# Patient Record
Sex: MALE | Race: OTHER | Employment: OTHER | URBAN - METROPOLITAN AREA
[De-identification: names, ages, dates, MRNs, and addresses within clinical notes are randomized per-mention and may not be internally consistent; named-entity substitution may affect disease eponyms.]

---

## 2020-07-20 ENCOUNTER — OFFICE VISIT (OUTPATIENT)
Dept: FAMILY MEDICINE CLINIC | Age: 75
End: 2020-07-20
Payer: COMMERCIAL

## 2020-07-20 VITALS
OXYGEN SATURATION: 99 % | HEIGHT: 64 IN | DIASTOLIC BLOOD PRESSURE: 78 MMHG | TEMPERATURE: 97.2 F | HEART RATE: 70 BPM | WEIGHT: 134 LBS | BODY MASS INDEX: 22.88 KG/M2 | SYSTOLIC BLOOD PRESSURE: 130 MMHG

## 2020-07-20 PROBLEM — I35.8 AORTIC HEART MURMUR: Status: ACTIVE | Noted: 2020-07-20

## 2020-07-20 PROCEDURE — 99203 OFFICE O/P NEW LOW 30 MIN: CPT | Performed by: FAMILY MEDICINE

## 2020-07-20 RX ORDER — BLOOD-GLUCOSE METER
1 KIT MISCELLANEOUS DAILY
Qty: 1 KIT | Refills: 1 | Status: SHIPPED | OUTPATIENT
Start: 2020-07-20 | End: 2022-06-27 | Stop reason: SDUPTHER

## 2020-07-20 RX ORDER — ATORVASTATIN CALCIUM 10 MG/1
10 TABLET, FILM COATED ORAL DAILY
Qty: 90 TABLET | Refills: 3 | Status: SHIPPED | OUTPATIENT
Start: 2020-07-20 | End: 2021-12-27 | Stop reason: SDUPTHER

## 2020-07-20 RX ORDER — ATORVASTATIN CALCIUM 10 MG/1
TABLET, FILM COATED ORAL
COMMUNITY
End: 2020-07-20 | Stop reason: SDUPTHER

## 2020-07-20 SDOH — HEALTH STABILITY: MENTAL HEALTH: HOW OFTEN DO YOU HAVE A DRINK CONTAINING ALCOHOL?: NEVER

## 2020-07-20 ASSESSMENT — PATIENT HEALTH QUESTIONNAIRE - PHQ9
SUM OF ALL RESPONSES TO PHQ9 QUESTIONS 1 & 2: 0
SUM OF ALL RESPONSES TO PHQ QUESTIONS 1-9: 0
2. FEELING DOWN, DEPRESSED OR HOPELESS: 0
1. LITTLE INTEREST OR PLEASURE IN DOING THINGS: 0
SUM OF ALL RESPONSES TO PHQ QUESTIONS 1-9: 0

## 2020-07-20 ASSESSMENT — ENCOUNTER SYMPTOMS
CONSTIPATION: 0
ALLERGIC/IMMUNOLOGIC NEGATIVE: 1
DIARRHEA: 0
BLOOD IN STOOL: 0
EYES NEGATIVE: 1
ABDOMINAL PAIN: 0
COUGH: 0
SHORTNESS OF BREATH: 0

## 2020-07-20 NOTE — PROGRESS NOTES
He does not see a podiatrist.Eye exam is current. Vitals:    07/20/20 1510   BP: 130/78   Pulse: 70   Temp: 97.2 °F (36.2 °C)   SpO2: 99%   Weight: 134 lb (60.8 kg)   Height: 5' 4\" (1.626 m)       REVIEW OF SYSTEMS    Review of Systems   Constitutional: Negative for fatigue, fever, unexpected weight change and weight loss. HENT: Negative. Eyes: Negative. Respiratory: Negative for cough and shortness of breath. Cardiovascular: Negative for chest pain and leg swelling. Gastrointestinal: Negative for abdominal pain, blood in stool, constipation and diarrhea. Endocrine: Negative. Genitourinary: Negative for frequency and urgency. Musculoskeletal: Negative. Skin: Negative. Allergic/Immunologic: Negative. Neurological: Negative for dizziness and headaches. Hematological: Negative. Psychiatric/Behavioral: Negative for sleep disturbance. The patient is not nervous/anxious.         PAST MEDICAL HISTORY    Past Medical History:   Diagnosis Date    Diabetes type 2, controlled ([facility] [address]     Hyperlipidemia        FAMILY HISTORY    Family History   Problem Relation Age of Onset    Parkinsonism Father        SOCIAL HISTORY    Social History     Socioeconomic History    Marital status:      Spouse name: None    Number of children: 1    Years of education: None    Highest education level: None   Occupational History    Occupation: retired    Social Needs    Financial resource strain: None    Food insecurity     Worry: None     Inability: None    Transportation needs     Medical: None     Non-medical: None   Tobacco Use    Smoking status: Never Smoker    Smokeless tobacco: Never Used   Substance and Sexual Activity    Alcohol use: Never     Frequency: Never    Drug use: Never    Sexual activity: None   Lifestyle    Physical activity     Days per week: None     Minutes per session: None    Stress: None   Relationships    Social connections     Talks on phone: None Gets together: None     Attends Gnosticism service: None     Active member of club or organization: None     Attends meetings of clubs or organizations: None     Relationship status: None    Intimate partner violence     Fear of current or ex partner: None     Emotionally abused: None     Physically abused: None     Forced sexual activity: None   Other Topics Concern    None   Social History Narrative    None       SURGICAL HISTORY    Past Surgical History:   Procedure Laterality Date   Thedore Bio  2001    COLONOSCOPY  2014    2004. Dr. Lui De La Torre at [address] [facility]    Current Outpatient Medications   Medication Sig Dispense Refill    metFORMIN (GLUCOPHAGE) 500 MG tablet Take 1 tablet by mouth 3 times daily (with meals) 270 tablet 3    atorvastatin (LIPITOR) 10 MG tablet Take 1 tablet by mouth daily 90 tablet 3    glucose monitoring kit (FREESTYLE) monitoring kit 1 kit by Does not apply route daily 1 kit 1     No current facility-administered medications for this visit. ALLERGIES    Allergies not on file    PHYSICAL EXAM   Physical Exam  Vitals signs reviewed. Constitutional:       Appearance: He is well-developed. HENT:      Head: Normocephalic. Eyes:      Extraocular Movements: Extraocular movements intact. Conjunctiva/sclera: Conjunctivae normal.      Pupils: Pupils are equal, round, and reactive to light. Neck:      Musculoskeletal: Normal range of motion and neck supple. Thyroid: No thyromegaly. Cardiovascular:      Rate and Rhythm: Normal rate and regular rhythm. Heart sounds: Murmur present. Systolic murmur present with a grade of 2/6. Comments: Slight murmur rt sternal border. Pulmonary:      Effort: Pulmonary effort is normal.      Breath sounds: Normal breath sounds. No wheezing or rales. Abdominal:      Palpations: Abdomen is soft. Tenderness: There is no abdominal tenderness. There is no guarding or rebound.    Musculoskeletal: Normal range of motion. General: No tenderness or deformity. Lymphadenopathy:      Cervical: No cervical adenopathy. Skin:     General: Skin is warm and dry. Neurological:      Mental Status: He is alert and oriented to person, place, and time. Psychiatric:         Behavior: Behavior normal.         Assessment/PLan  1. Controlled type 2 diabetes mellitus without complication, without long-term current use of insulin (HCC)  Chronic, stable. He is requesting a new glucometer.   - metFORMIN (GLUCOPHAGE) 500 MG tablet; Take 1 tablet by mouth 3 times daily (with meals)  Dispense: 270 tablet; Refill: 3  - glucose monitoring kit (FREESTYLE) monitoring kit; 1 kit by Does not apply route daily  Dispense: 1 kit; Refill: 1  - Comprehensive Metabolic Panel; Future  - Lipid Panel; Future  - Hemoglobin A1C; Future    2. Mixed hyperlipidemia  Chronic, stable, continue current medication and/or plan    - atorvastatin (LIPITOR) 10 MG tablet; Take 1 tablet by mouth daily  Dispense: 90 tablet; Refill: 3  - Lipid Panel; Future    3. Encounter to establish care  Reviewed hx with pt and available records in his chart. 4. Cardiac murmur. Discussed possible aortic valve. Monitor for any sx of sob, chest pain. Jazlyn Karly received counseling on the following healthy behaviors: nutrition, exercise and medication adherence  Reviewed prior labs and health maintenance. Continue current medications, diet and exercise. Discussed use, benefit, and side effects of prescribed medications. Barriers to medication compliance addressed. Patient given educational materials - see patient instructions. All patient questions answered. Patient voiced understanding. Return in about 5 months (around 12/20/2020) for diabetes, cholesterol.         Electronically signed by Tammy Gupta MD on 7/20/20 at 3:18 PM EDT

## 2020-07-24 RX ORDER — GLUCOSAMINE HCL/CHONDROITIN SU 500-400 MG
CAPSULE ORAL
Qty: 100 STRIP | Refills: 0 | Status: SHIPPED | OUTPATIENT
Start: 2020-07-24 | End: 2020-07-30 | Stop reason: SDUPTHER

## 2020-07-24 RX ORDER — LANCETS 30 GAUGE
1 EACH MISCELLANEOUS 2 TIMES DAILY
Qty: 100 EACH | Refills: 0 | Status: SHIPPED | OUTPATIENT
Start: 2020-07-24 | End: 2020-07-30 | Stop reason: SDUPTHER

## 2020-07-24 RX ORDER — BLOOD-GLUCOSE METER
1 KIT MISCELLANEOUS DAILY
Qty: 1 KIT | Refills: 0 | Status: SHIPPED | OUTPATIENT
Start: 2020-07-24 | End: 2020-07-30 | Stop reason: SDUPTHER

## 2020-07-30 RX ORDER — LANCETS 30 GAUGE
1 EACH MISCELLANEOUS 2 TIMES DAILY
Qty: 100 EACH | Refills: 0 | Status: SHIPPED | OUTPATIENT
Start: 2020-07-30 | End: 2020-11-30 | Stop reason: SDUPTHER

## 2020-07-30 RX ORDER — BLOOD-GLUCOSE METER
1 KIT MISCELLANEOUS DAILY
Qty: 1 KIT | Refills: 0 | Status: SHIPPED | OUTPATIENT
Start: 2020-07-30

## 2020-07-30 RX ORDER — GLUCOSAMINE HCL/CHONDROITIN SU 500-400 MG
CAPSULE ORAL
Qty: 100 STRIP | Refills: 0 | Status: SHIPPED | OUTPATIENT
Start: 2020-07-30 | End: 2021-12-28

## 2020-11-30 RX ORDER — LANCETS 30 GAUGE
1 EACH MISCELLANEOUS 2 TIMES DAILY
Qty: 100 EACH | Refills: 3 | Status: SHIPPED | OUTPATIENT
Start: 2020-11-30 | End: 2021-12-28

## 2020-12-21 ENCOUNTER — HOSPITAL ENCOUNTER (OUTPATIENT)
Age: 75
Setting detail: SPECIMEN
Discharge: HOME | End: 2020-12-21
Payer: COMMERCIAL

## 2020-12-21 LAB
ALBUMIN SERPL-MCNC: 4.3 G/DL (ref 3.5–5.2)
ALBUMIN/GLOBULIN RATIO: 1.7 (ref 1–2.5)
ALP BLD-CCNC: 67 U/L (ref 40–129)
ALT SERPL-CCNC: 11 U/L (ref 5–41)
ANION GAP SERPL CALCULATED.3IONS-SCNC: 13 MMOL/L (ref 9–17)
AST SERPL-CCNC: 19 U/L
BILIRUB SERPL-MCNC: 0.45 MG/DL (ref 0.3–1.2)
BUN BLDV-MCNC: 15 MG/DL (ref 8–23)
BUN/CREAT BLD: NORMAL (ref 9–20)
CALCIUM SERPL-MCNC: 9.1 MG/DL (ref 8.6–10.4)
CHLORIDE BLD-SCNC: 101 MMOL/L (ref 98–107)
CHOLESTEROL/HDL RATIO: 2.4
CHOLESTEROL: 121 MG/DL
CO2: 25 MMOL/L (ref 20–31)
CREAT SERPL-MCNC: 1.08 MG/DL (ref 0.7–1.2)
ESTIMATED AVERAGE GLUCOSE: 131 MG/DL
GFR AFRICAN AMERICAN: >60 ML/MIN
GFR NON-AFRICAN AMERICAN: >60 ML/MIN
GFR SERPL CREATININE-BSD FRML MDRD: NORMAL ML/MIN/{1.73_M2}
GFR SERPL CREATININE-BSD FRML MDRD: NORMAL ML/MIN/{1.73_M2}
GLUCOSE BLD-MCNC: 92 MG/DL (ref 70–99)
HBA1C MFR BLD: 6.2 % (ref 4–6)
HDLC SERPL-MCNC: 50 MG/DL
LDL CHOLESTEROL: 56 MG/DL (ref 0–130)
POTASSIUM SERPL-SCNC: 4.5 MMOL/L (ref 3.7–5.3)
SODIUM BLD-SCNC: 139 MMOL/L (ref 135–144)
TOTAL PROTEIN: 6.8 G/DL (ref 6.4–8.3)
TRIGL SERPL-MCNC: 73 MG/DL
VLDLC SERPL CALC-MCNC: NORMAL MG/DL (ref 1–30)

## 2020-12-30 ENCOUNTER — TELEMEDICINE (OUTPATIENT)
Dept: FAMILY MEDICINE CLINIC | Age: 75
End: 2020-12-30
Payer: MEDICARE

## 2020-12-30 PROCEDURE — 4040F PNEUMOC VAC/ADMIN/RCVD: CPT | Performed by: FAMILY MEDICINE

## 2020-12-30 PROCEDURE — 1123F ACP DISCUSS/DSCN MKR DOCD: CPT | Performed by: FAMILY MEDICINE

## 2020-12-30 PROCEDURE — G8427 DOCREV CUR MEDS BY ELIG CLIN: HCPCS | Performed by: FAMILY MEDICINE

## 2020-12-30 PROCEDURE — 99213 OFFICE O/P EST LOW 20 MIN: CPT | Performed by: FAMILY MEDICINE

## 2020-12-30 SDOH — ECONOMIC STABILITY: FOOD INSECURITY: WITHIN THE PAST 12 MONTHS, THE FOOD YOU BOUGHT JUST DIDN'T LAST AND YOU DIDN'T HAVE MONEY TO GET MORE.: NEVER TRUE

## 2020-12-30 SDOH — ECONOMIC STABILITY: TRANSPORTATION INSECURITY
IN THE PAST 12 MONTHS, HAS THE LACK OF TRANSPORTATION KEPT YOU FROM MEDICAL APPOINTMENTS OR FROM GETTING MEDICATIONS?: NO

## 2020-12-30 SDOH — ECONOMIC STABILITY: INCOME INSECURITY: HOW HARD IS IT FOR YOU TO PAY FOR THE VERY BASICS LIKE FOOD, HOUSING, MEDICAL CARE, AND HEATING?: NOT HARD AT ALL

## 2020-12-30 SDOH — ECONOMIC STABILITY: TRANSPORTATION INSECURITY
IN THE PAST 12 MONTHS, HAS LACK OF TRANSPORTATION KEPT YOU FROM MEETINGS, WORK, OR FROM GETTING THINGS NEEDED FOR DAILY LIVING?: NO

## 2020-12-30 SDOH — ECONOMIC STABILITY: FOOD INSECURITY: WITHIN THE PAST 12 MONTHS, YOU WORRIED THAT YOUR FOOD WOULD RUN OUT BEFORE YOU GOT MONEY TO BUY MORE.: NEVER TRUE

## 2020-12-30 ASSESSMENT — ENCOUNTER SYMPTOMS
ABDOMINAL PAIN: 0
BLOOD IN STOOL: 0
ALLERGIC/IMMUNOLOGIC NEGATIVE: 1
EYES NEGATIVE: 1
CONSTIPATION: 0
DIARRHEA: 0
COUGH: 0
SHORTNESS OF BREATH: 0

## 2020-12-30 NOTE — PROGRESS NOTES
[facility]  [facility]  [address]  [facility] [address]  Dept: [phone]    2020    TELEHEALTH EVALUATION -- Audio/Visual (During [facility]-49 public health emergency)  Jairon Schilling (:  1945) has requested an audio/video evaluation for the following concern(s):    HPI:  Video visit to discuss diabetes. Has been checking blood sugar at home and it runs in the low 90s to 100s. Blood pressure readings at home are typically around 130/80 but has occasionally had readings up to 160. He is active with walking outside. He is following a healthy diet. Recent lab tests were reviewed which were normal.  A1c was 6.2. He is taking a statin for hyperlipidemia. Has not been on any blood pressure medication. He was prescribed lisinopril in the past but did not take it because his blood pressure was normal.    Diabetes  He presents for his follow-up diabetic visit. He has type 2 diabetes mellitus. His disease course has been stable. There are no hypoglycemic associated symptoms. Pertinent negatives for hypoglycemia include no dizziness, headaches or nervousness/anxiousness. There are no diabetic associated symptoms. Pertinent negatives for diabetes include no chest pain and no fatigue. There are no hypoglycemic complications. Symptoms are stable. Pertinent negatives for diabetic complications include no heart disease, nephropathy or peripheral neuropathy. Risk factors for coronary artery disease include diabetes mellitus, dyslipidemia and male sex. Current diabetic treatment includes oral agent (monotherapy). He is compliant with treatment all of the time. His weight is stable. He is following a generally healthy diet. He has not had a previous visit with a dietitian. He participates in exercise daily. His home blood glucose trend is fluctuating minimally. His overall blood glucose range is  mg/dl.  An ACE inhibitor/angiotensin II receptor blocker is not being taken. There were no vitals filed for this visit. REVIEW OF SYSTEMS:   Review of Systems   Constitutional: Negative for fatigue, fever and unexpected weight change. HENT: Negative. Eyes: Negative. Respiratory: Negative for cough and shortness of breath. Cardiovascular: Negative for chest pain and leg swelling. Gastrointestinal: Negative for abdominal pain, blood in stool, constipation and diarrhea. Endocrine: Negative. Genitourinary: Negative for frequency and urgency. Musculoskeletal: Negative. Skin: Negative. Allergic/Immunologic: Negative. Neurological: Negative for dizziness and headaches. Hematological: Negative. Psychiatric/Behavioral: Negative for sleep disturbance. The patient is not nervous/anxious.         PAST MEDICAL HISTORY:    Past Medical History:   Diagnosis Date    Diabetes type 2, controlled ([facility] [address]     Hyperlipidemia        FAMILY HISTORY:    Family History   Problem Relation Age of Onset    Parkinsonism Father        SOCIAL HISTORY:    Social History     Socioeconomic History    Marital status:      Spouse name: None    Number of children: 1    Years of education: None    Highest education level: None   Occupational History    Occupation: retired    Social Needs    Financial resource strain: Not hard at all   [place] insecurity     Worry: Never true     Inability: Never true    Transportation needs     Medical: No     Non-medical: No   Tobacco Use    Smoking status: Never Smoker    Smokeless tobacco: Never Used   Substance and Sexual Activity    Alcohol use: Never     Frequency: Never    Drug use: Never    Sexual activity: None   Lifestyle    Physical activity     Days per week: None     Minutes per session: None    Stress: None   Relationships    Social connections     Talks on phone: None     Gets together: None     Attends Yarsanism service: None     Active member of club or organization: None ASSESSMENT/PLAN:  1. Controlled type 2 diabetes mellitus without complication, without long-term current use of insulin (HCC)  Stable condition. Blood sugars under good control. Continue medications, exercise and low-carb diet. Continue to monitor blood pressure if it does go to 140 or higher on a consistent basis and I will add lisinopril. Reviewed recent labs with patient. 2. Mixed hyperlipidemia  Chronic and stable condition. Continue statin    Discussed getting Covid vaccine when it becomes available to him. He is agreeable to this. Return in about 6 months (around 6/30/2021) for diabetes. Arnold Henao is a 66 y.o. male being evaluated by a Virtual Visit (video visit) encounter to address concerns as mentioned above. A caregiver was present when appropriate. Due to this being a TeleHealth encounter (During [facility] public health emergency), evaluation of the following organ systems was limited: Vitals/Constitutional/EENT/Resp/CV/GI//MS/Neuro/Skin/Heme-Lymph-Imm. Pursuant to the emergency declaration under the [address] [place] [address] and the [employer] and [employer] Act, this Virtual Visit was conducted with patient's (and/or legal guardian's) consent, to reduce the patient's risk of exposure to COVID-19 and provide necessary medical care. The patient (and/or legal guardian) has also been advised to contact this office for worsening conditions or problems, and seek emergency medical treatment and/or call 911 if deemed necessary. Services were provided through a video synchronous discussion virtually to substitute for in-person clinic visit. Patient and provider were located at their individual homes. --Clinton Cortés MD on 12/30/2020 at 9:29 AM    An electronic signature was used to authenticate this note.

## 2021-04-27 ENCOUNTER — OFFICE VISIT (OUTPATIENT)
Dept: FAMILY MEDICINE CLINIC | Age: 76
End: 2021-04-27
Payer: MEDICARE

## 2021-04-27 VITALS
DIASTOLIC BLOOD PRESSURE: 72 MMHG | WEIGHT: 132.2 LBS | BODY MASS INDEX: 22.69 KG/M2 | TEMPERATURE: 97.5 F | OXYGEN SATURATION: 99 % | HEART RATE: 66 BPM | SYSTOLIC BLOOD PRESSURE: 144 MMHG

## 2021-04-27 DIAGNOSIS — M25.532 LEFT WRIST PAIN: ICD-10-CM
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
## 2021-04-27 PROCEDURE — 1036F TOBACCO NON-USER: CPT | Performed by: NURSE PRACTITIONER

## 2021-04-27 PROCEDURE — 4040F PNEUMOC VAC/ADMIN/RCVD: CPT | Performed by: NURSE PRACTITIONER

## 2021-04-27 PROCEDURE — G8427 DOCREV CUR MEDS BY ELIG CLIN: HCPCS | Performed by: NURSE PRACTITIONER

## 2021-04-27 PROCEDURE — 1123F ACP DISCUSS/DSCN MKR DOCD: CPT | Performed by: NURSE PRACTITIONER

## 2021-04-27 PROCEDURE — 99214 OFFICE O/P EST MOD 30 MIN: CPT | Performed by: NURSE PRACTITIONER

## 2021-04-27 PROCEDURE — G8420 CALC BMI NORM PARAMETERS: HCPCS | Performed by: NURSE PRACTITIONER

## 2021-04-27 ASSESSMENT — ENCOUNTER SYMPTOMS
VOMITING: 0
GASTROINTESTINAL NEGATIVE: 1
SHORTNESS OF BREATH: 1
CONSTIPATION: 0
EYES NEGATIVE: 1
NAUSEA: 0
ABDOMINAL PAIN: 0
DIARRHEA: 0
COUGH: 0

## 2021-04-27 ASSESSMENT — PATIENT HEALTH QUESTIONNAIRE - PHQ9: SUM OF ALL RESPONSES TO PHQ QUESTIONS 1-9: 0

## 2021-04-27 NOTE — PROGRESS NOTES
[facility]  [facility]  [address]  [address]  Dept: [phone]    4/28/2021    CHIEF COMPLAINT    Chief Complaint   Patient presents with   Kranthi CAZARES    Louis Avila is a 66 y.o. male who presents   Chief Complaint   Patient presents with    Fall     Appointment to discuss recent fall. Fall on Friday- He was walking per his usual 3-4 miles per day. He lost his footing when lifting his foot up onto a curb. He notes that he fell \"flat on his face\". A person close by was a physician and came to his aid. She told him that his pulse was 22 initially, but \"it was coming back up to normal\"  He was evaluated by EMTs who checked his BP & blood sugar. He felt okay and was not transported to the hospital. He  was dropped off at his car and went home where he was valuated by his daughter who is a physician and monitored by his wife. TODAY- noting pain in left wrist,  bruise on his right knee facial abrasions. He notes his swelling and pain across his forehead is resolving no longer tender to the touch. He denies headaches, dizziness, nausea and blurred vision. He denies taking any blood thinners or excessive amounts of NSAIDs prior to fall. He has been taking Advil and tylenol as needed for pains. He would like a referral for physical therapy for balance training. He is not interested in any further testing or x-rays at this time    Notable history of fall at home in 2001. He notes that he had a similar issue losing his footing. Sometime after the fall he developed headaches and was found to have a subdural hematoma. DM- noting his fasting BG everyday is always below 100 for the last 1 year.         Vitals:    04/27/21 1259 04/27/21 1349   BP: (!) 146/70 (!) 144/72   Site:  Left Upper Arm   Position:  Sitting   Cuff Size:  Medium Adult   Pulse: 66    Temp: 97.5 °F (36.4 °C)    SpO2: 99%    Weight: 132 lb 3.2 oz (60 kg) Gets together: None     Attends Jew service: None     Active member of club or organization: None     Attends meetings of clubs or organizations: None     Relationship status: None    Intimate partner violence     Fear of current or ex partner: None     Emotionally abused: None     Physically abused: None     Forced sexual activity: None   Other Topics Concern    None   Social History Narrative    None       SURGICAL HISTORY    Past Surgical History:   Procedure Laterality Date   Mali Stahl  2001    COLONOSCOPY  2014 2004. Dr. Litzy Horton at [facility]    Current Outpatient Medications   Medication Sig Dispense Refill    Lancets MISC 1 each by Does not apply route 2 times daily 100 each 3    blood glucose monitor strips Test 3 times a day & as needed for symptoms of irregular blood glucose. 100 strip 0    glucose monitoring kit (FREESTYLE) monitoring kit 1 kit by Does not apply route daily 1 kit 0    metFORMIN (GLUCOPHAGE) 500 MG tablet Take 1 tablet by mouth 3 times daily (with meals) 270 tablet 3    atorvastatin (LIPITOR) 10 MG tablet Take 1 tablet by mouth daily 90 tablet 3    calcium carbonate 1500 (600 Ca) MG TABS tablet Take 600 mg by mouth daily (with breakfast)      glucose monitoring kit (FREESTYLE) monitoring kit 1 kit by Does not apply route daily 1 kit 1     No current facility-administered medications for this visit. ALLERGIES    Allergies   Allergen Reactions    No Known Allergies        PHYSICAL EXAM   Physical Exam  Vitals signs and nursing note reviewed. Constitutional:       General: He is not in acute distress. Appearance: He is well-developed. He is not diaphoretic. Interventions: Face mask in place. HENT:      Head: Normocephalic. Abrasion present. Right Ear: Hearing, tympanic membrane, ear canal and external ear normal. No middle ear effusion. There is no impacted cerumen.       Left Ear: Hearing, tympanic membrane, ear canal and external ear normal.  No middle ear effusion. There is no impacted cerumen. Eyes:      General:         Right eye: No discharge. Left eye: No discharge. Pupils: Pupils are equal.   Neck:      Musculoskeletal: Normal range of motion. Cardiovascular:      Rate and Rhythm: Normal rate and regular rhythm. Pulses: Normal pulses. Radial pulses are 2+ on the right side and 2+ on the left side. Heart sounds: Normal heart sounds, S1 normal and S2 normal. No murmur. Pulmonary:      Effort: Pulmonary effort is normal. No respiratory distress. Breath sounds: Normal breath sounds. No wheezing. Musculoskeletal:      Left wrist: He exhibits tenderness (along lateral edge. ). He exhibits normal range of motion, no bony tenderness, no swelling, no effusion and no deformity. Arms:    Skin:     General: Skin is warm and dry. Neurological:      Mental Status: He is alert and oriented to person, place, and time. Motor: No weakness or tremor. Coordination: Coordination normal.      Gait: Gait normal.   Psychiatric:         Behavior: Behavior normal. Behavior is cooperative. ASSESSMENT/PLAN  1. Fall, initial encounter  -     External Referral To Physical Therapy  -Refer patient to [facility] for balance and mobility training.  -Discussed ordering blood work, CT scan and EKG with patient. Patient declines all testing at this time. Patient feels testing is unnecessary due to being asymptomatic. Patient has follow-up with Dr. Davi Kessler in June, but will call office for any concerning symptoms prior to that time    2. Left wrist pain    -We will hold off on ordering an x-ray at this time  given normal ROM and minimal tenderness.    - Patient to call office if symptoms worsen or fail to  fully improve    Anupam received counseling on the following healthy behaviors: nutrition, exercise and medication adherence  Reviewed prior labs and health maintenance.   Continue current

## 2021-06-28 ENCOUNTER — OFFICE VISIT (OUTPATIENT)
Dept: FAMILY MEDICINE CLINIC | Age: 76
End: 2021-06-28
Payer: MEDICARE

## 2021-06-28 VITALS
SYSTOLIC BLOOD PRESSURE: 130 MMHG | HEART RATE: 70 BPM | OXYGEN SATURATION: 98 % | DIASTOLIC BLOOD PRESSURE: 60 MMHG | HEIGHT: 65 IN | WEIGHT: 133.4 LBS | TEMPERATURE: 97.4 F | BODY MASS INDEX: 22.23 KG/M2

## 2021-06-28 DIAGNOSIS — E78.2 MIXED HYPERLIPIDEMIA: ICD-10-CM

## 2021-06-28 DIAGNOSIS — E11.9 CONTROLLED TYPE 2 DIABETES MELLITUS WITHOUT COMPLICATION, WITHOUT LONG-TERM CURRENT USE OF INSULIN (HCC): Primary | ICD-10-CM

## 2021-06-28 DIAGNOSIS — Z13.0 SCREENING FOR ENDOCRINE, METABOLIC AND IMMUNITY DISORDER: ICD-10-CM

## 2021-06-28 DIAGNOSIS — Z13.228 SCREENING FOR ENDOCRINE, METABOLIC AND IMMUNITY DISORDER: ICD-10-CM

## 2021-06-28 DIAGNOSIS — Z13.29 SCREENING FOR ENDOCRINE, METABOLIC AND IMMUNITY DISORDER: ICD-10-CM

## 2021-06-28 PROCEDURE — G8420 CALC BMI NORM PARAMETERS: HCPCS | Performed by: FAMILY MEDICINE

## 2021-06-28 PROCEDURE — 4040F PNEUMOC VAC/ADMIN/RCVD: CPT | Performed by: FAMILY MEDICINE

## 2021-06-28 PROCEDURE — G8427 DOCREV CUR MEDS BY ELIG CLIN: HCPCS | Performed by: FAMILY MEDICINE

## 2021-06-28 PROCEDURE — 1036F TOBACCO NON-USER: CPT | Performed by: FAMILY MEDICINE

## 2021-06-28 PROCEDURE — 99214 OFFICE O/P EST MOD 30 MIN: CPT | Performed by: FAMILY MEDICINE

## 2021-06-28 PROCEDURE — 1123F ACP DISCUSS/DSCN MKR DOCD: CPT | Performed by: FAMILY MEDICINE

## 2021-06-28 ASSESSMENT — ENCOUNTER SYMPTOMS
EYES NEGATIVE: 1
SHORTNESS OF BREATH: 0
DIARRHEA: 0
ABDOMINAL PAIN: 0
ALLERGIC/IMMUNOLOGIC NEGATIVE: 1
COUGH: 0
CONSTIPATION: 0
BLOOD IN STOOL: 0

## 2021-06-28 NOTE — PROGRESS NOTES
[facility]  [facility]  [address]  [facility] [address]  Dept: [phone]    6/28/2021    CHIEF COMPLAINT    Chief Complaint   Patient presents with    Diabetes       HPI    Tila Boswell is a 66 y.o. male who presents   Chief Complaint   Patient presents with    Diabetes   . Recheck chronic medical condtions. Taking medications as prescribed with no side effects and good effectiveness. Walking daily. Diabetes  He presents for his follow-up diabetic visit. He has type 2 diabetes mellitus. His disease course has been stable. There are no hypoglycemic associated symptoms. Pertinent negatives for hypoglycemia include no dizziness, headaches or nervousness/anxiousness. Pertinent negatives for diabetes include no chest pain and no fatigue. There are no hypoglycemic complications. There are no diabetic complications. Risk factors for coronary artery disease include diabetes mellitus, dyslipidemia and male sex. Current diabetic treatment includes oral agent (monotherapy). He is compliant with treatment all of the time. He is following a generally healthy diet. He participates in exercise daily. An ACE inhibitor/angiotensin II receptor blocker is not being taken. Eye exam is current. Vitals:    06/28/21 0903   BP: 130/60   Pulse: 70   Temp: 97.4 °F (36.3 °C)   SpO2: 98%   Weight: 133 lb 6.4 oz (60.5 kg)   Height: 5' 4.5\" (1.638 m)       REVIEW OF SYSTEMS    Review of Systems   Constitutional: Negative for fatigue, fever and unexpected weight change. HENT: Negative. Eyes: Negative. Respiratory: Negative for cough and shortness of breath. Cardiovascular: Negative for chest pain and leg swelling. Gastrointestinal: Negative for abdominal pain, blood in stool, constipation and diarrhea. Endocrine: Negative. Genitourinary: Negative for frequency and urgency. Musculoskeletal: Negative. Skin: Negative.          Dry skin, improves with lotion   Allergic/Immunologic: Negative. Neurological: Negative for dizziness and headaches. Hematological: Negative. Psychiatric/Behavioral: Negative for sleep disturbance. The patient is not nervous/anxious. PAST MEDICAL HISTORY    Past Medical History:   Diagnosis Date    Diabetes type 2, controlled ([address]     Hyperlipidemia        FAMILY HISTORY    Family History   Problem Relation Age of Onset    Parkinsonism Father        SOCIAL HISTORY    Social History     Socioeconomic History    Marital status:      Spouse name: None    Number of children: 1    Years of education: None    Highest education level: None   Occupational History    Occupation: retired    Tobacco Use    Smoking status: Never Smoker    Smokeless tobacco: Never Used   Substance and Sexual Activity    Alcohol use: Never    Drug use: Never    Sexual activity: None   Other Topics Concern    None   Social History Narrative    None     Social Determinants of Health     Financial Resource Strain: Low Risk     Difficulty of Paying Living Expenses: Not hard at all   Food Insecurity: No Food Insecurity    Worried About Running Out of Food in the Last Year: Never true    [place] of Food in the Last Year: Never true   Transportation Needs: No Transportation Needs    Lack of Transportation (Medical): No    Lack of Transportation (Non-Medical):  No   Physical Activity:     Days of Exercise per Week:     Minutes of Exercise per Session:    Stress:     Feeling of Stress :    Social Connections:     Frequency of Communication with Friends and Family:     Frequency of Social Gatherings with Friends and Family:     Attends Congregational Services:     Active Member of Clubs or Organizations:     Attends Club or Organization Meetings:     Marital Status:    Intimate Partner Violence:     Fear of Current or Ex-Partner:     Emotionally Abused:     Physically Abused:     Sexually Abused: SURGICAL HISTORY    Past Surgical History:   Procedure Laterality Date   Mali Stahl  2001    COLONOSCOPY  2014 2004. Dr. Litzy Horton at [address] [facility]    Current Outpatient Medications   Medication Sig Dispense Refill    calcium carbonate 1500 (600 Ca) MG TABS tablet Take 600 mg by mouth daily (with breakfast)      Lancets MISC 1 each by Does not apply route 2 times daily 100 each 3    blood glucose monitor strips Test 3 times a day & as needed for symptoms of irregular blood glucose. 100 strip 0    glucose monitoring kit (FREESTYLE) monitoring kit 1 kit by Does not apply route daily 1 kit 0    metFORMIN (GLUCOPHAGE) 500 MG tablet Take 1 tablet by mouth 3 times daily (with meals) 270 tablet 3    atorvastatin (LIPITOR) 10 MG tablet Take 1 tablet by mouth daily 90 tablet 3    glucose monitoring kit (FREESTYLE) monitoring kit 1 kit by Does not apply route daily 1 kit 1     No current facility-administered medications for this visit. ALLERGIES    Allergies   Allergen Reactions    No Known Allergies        PHYSICAL EXAM   Physical Exam  Vitals reviewed. Constitutional:       Appearance: He is well-developed and normal weight. HENT:      Head: Normocephalic. Eyes:      Conjunctiva/sclera: Conjunctivae normal.      Pupils: Pupils are equal, round, and reactive to light. Neck:      Thyroid: No thyromegaly. Cardiovascular:      Rate and Rhythm: Normal rate and regular rhythm. Heart sounds: Normal heart sounds. No murmur heard. Pulmonary:      Effort: Pulmonary effort is normal.      Breath sounds: Normal breath sounds. No wheezing or rales. Abdominal:      Palpations: Abdomen is soft. Tenderness: There is no abdominal tenderness. There is no guarding or rebound. Musculoskeletal:         General: No tenderness or deformity. Normal range of motion. Cervical back: Normal range of motion and neck supple.    Lymphadenopathy:      Cervical: No cervical adenopathy. Skin:     General: Skin is warm and dry. Neurological:      Mental Status: He is alert and oriented to person, place, and time. Psychiatric:         Mood and Affect: Mood normal.         Behavior: Behavior normal.         Thought Content: Thought content normal.         Judgment: Judgment normal.         ASSESSMENT/PLAN  1. Controlled type 2 diabetes mellitus without complication, without long-term current use of insulin (HCC)  Chronic, stable, continue current medication and/or plan  - Comprehensive Metabolic Panel; Future  - Lipid Panel; Future  - Hemoglobin A1C; Future    2. Mixed hyperlipidemia  Chronic, stable, continue current medication and/or plan    - Lipid Panel; Future    3. Screening for endocrine, metabolic and immunity disorder    - CBC Auto Differential; Future       Anupam received counseling on the following healthy behaviors: nutrition, exercise and medication adherence  Reviewed prior labs and health maintenance. Continue current medications, diet and exercise. Discussed use, benefit, and side effects of prescribed medications. Barriers to medication compliance addressed. Patient given educational materials - see patient instructions. All patient questions answered. Patient voiced understanding. Return in about 6 months (around 12/28/2021) for diabetes.         Electronically signed by Danette Pineda MD on 6/28/21 at 9:05 AM EDT

## 2021-07-01 ENCOUNTER — HOSPITAL ENCOUNTER (OUTPATIENT)
Age: 76
Setting detail: SPECIMEN
Discharge: HOME | End: 2021-07-01
Payer: MEDICARE

## 2021-07-01 DIAGNOSIS — E11.9 CONTROLLED TYPE 2 DIABETES MELLITUS WITHOUT COMPLICATION, WITHOUT LONG-TERM CURRENT USE OF INSULIN (HCC): ICD-10-CM

## 2021-07-01 DIAGNOSIS — Z13.29 SCREENING FOR ENDOCRINE, METABOLIC AND IMMUNITY DISORDER: ICD-10-CM

## 2021-07-01 DIAGNOSIS — Z13.0 SCREENING FOR ENDOCRINE, METABOLIC AND IMMUNITY DISORDER: ICD-10-CM

## 2021-07-01 DIAGNOSIS — Z13.228 SCREENING FOR ENDOCRINE, METABOLIC AND IMMUNITY DISORDER: ICD-10-CM

## 2021-07-01 DIAGNOSIS — E78.2 MIXED HYPERLIPIDEMIA: ICD-10-CM

## 2021-07-01 LAB
ABSOLUTE EOS #: 0.1 K/UL (ref 0–0.44)
ABSOLUTE IMMATURE GRANULOCYTE: <0.03 K/UL (ref 0–0.3)
ABSOLUTE LYMPH #: 1.56 K/UL (ref 1.1–3.7)
ABSOLUTE MONO #: 0.39 K/UL (ref 0.1–1.2)
ALBUMIN SERPL-MCNC: 4.3 G/DL (ref 3.5–5.2)
ALBUMIN/GLOBULIN RATIO: 1.8 (ref 1–2.5)
ALP BLD-CCNC: 73 U/L (ref 40–129)
ALT SERPL-CCNC: 13 U/L (ref 5–41)
ANION GAP SERPL CALCULATED.3IONS-SCNC: 10 MMOL/L (ref 9–17)
AST SERPL-CCNC: 19 U/L
BASOPHILS # BLD: 0 % (ref 0–2)
BASOPHILS ABSOLUTE: <0.03 K/UL (ref 0–0.2)
BILIRUB SERPL-MCNC: 0.49 MG/DL (ref 0.3–1.2)
BUN BLDV-MCNC: 14 MG/DL (ref 8–23)
BUN/CREAT BLD: NORMAL (ref 9–20)
CALCIUM SERPL-MCNC: 9.3 MG/DL (ref 8.6–10.4)
CHLORIDE BLD-SCNC: 103 MMOL/L (ref 98–107)
CHOLESTEROL/HDL RATIO: 2.5
CHOLESTEROL: 124 MG/DL
CO2: 29 MMOL/L (ref 20–31)
CREAT SERPL-MCNC: 0.93 MG/DL (ref 0.7–1.2)
DIFFERENTIAL TYPE: NORMAL
EOSINOPHILS RELATIVE PERCENT: 2 % (ref 1–4)
ESTIMATED AVERAGE GLUCOSE: 126 MG/DL
GFR AFRICAN AMERICAN: >60 ML/MIN
GFR NON-AFRICAN AMERICAN: >60 ML/MIN
GFR SERPL CREATININE-BSD FRML MDRD: NORMAL ML/MIN/{1.73_M2}
GFR SERPL CREATININE-BSD FRML MDRD: NORMAL ML/MIN/{1.73_M2}
GLUCOSE BLD-MCNC: 95 MG/DL (ref 70–99)
HBA1C MFR BLD: 6 % (ref 4–6)
HCT VFR BLD CALC: 44.2 % (ref 40.7–50.3)
HDLC SERPL-MCNC: 49 MG/DL
HEMOGLOBIN: 14 G/DL (ref 13–17)
IMMATURE GRANULOCYTES: 0 %
LDL CHOLESTEROL: 57 MG/DL (ref 0–130)
LYMPHOCYTES # BLD: 28 % (ref 24–43)
MCH RBC QN AUTO: 30.6 PG (ref 25.2–33.5)
MCHC RBC AUTO-ENTMCNC: 31.7 G/DL (ref 28.4–34.8)
MCV RBC AUTO: 96.5 FL (ref 82.6–102.9)
MONOCYTES # BLD: 7 % (ref 3–12)
NRBC AUTOMATED: 0 PER 100 WBC
PDW BLD-RTO: 12.9 % (ref 11.8–14.4)
PLATELET # BLD: 167 K/UL (ref 138–453)
PLATELET ESTIMATE: NORMAL
PMV BLD AUTO: 12 FL (ref 8.1–13.5)
POTASSIUM SERPL-SCNC: 4.6 MMOL/L (ref 3.7–5.3)
RBC # BLD: 4.58 M/UL (ref 4.21–5.77)
RBC # BLD: NORMAL 10*6/UL
SEG NEUTROPHILS: 63 % (ref 36–65)
SEGMENTED NEUTROPHILS ABSOLUTE COUNT: 3.46 K/UL (ref 1.5–8.1)
SODIUM BLD-SCNC: 142 MMOL/L (ref 135–144)
TOTAL PROTEIN: 6.7 G/DL (ref 6.4–8.3)
TRIGL SERPL-MCNC: 90 MG/DL
VLDLC SERPL CALC-MCNC: NORMAL MG/DL (ref 1–30)
WBC # BLD: 5.5 K/UL (ref 3.5–11.3)
WBC # BLD: NORMAL 10*3/UL

## 2021-12-27 ENCOUNTER — OFFICE VISIT (OUTPATIENT)
Dept: FAMILY MEDICINE CLINIC | Age: 76
End: 2021-12-27
Payer: MEDICARE

## 2021-12-27 VITALS
BODY MASS INDEX: 22.75 KG/M2 | HEART RATE: 63 BPM | OXYGEN SATURATION: 99 % | SYSTOLIC BLOOD PRESSURE: 120 MMHG | WEIGHT: 134.6 LBS | TEMPERATURE: 97.1 F | DIASTOLIC BLOOD PRESSURE: 60 MMHG

## 2021-12-27 DIAGNOSIS — E78.2 MIXED HYPERLIPIDEMIA: ICD-10-CM

## 2021-12-27 DIAGNOSIS — I35.8 AORTIC HEART MURMUR: ICD-10-CM

## 2021-12-27 DIAGNOSIS — E11.9 CONTROLLED TYPE 2 DIABETES MELLITUS WITHOUT COMPLICATION, WITHOUT LONG-TERM CURRENT USE OF INSULIN (HCC): Primary | ICD-10-CM

## 2021-12-27 LAB — HBA1C MFR BLD: 6.3 %

## 2021-12-27 PROCEDURE — G8420 CALC BMI NORM PARAMETERS: HCPCS | Performed by: FAMILY MEDICINE

## 2021-12-27 PROCEDURE — 1123F ACP DISCUSS/DSCN MKR DOCD: CPT | Performed by: FAMILY MEDICINE

## 2021-12-27 PROCEDURE — 99214 OFFICE O/P EST MOD 30 MIN: CPT | Performed by: FAMILY MEDICINE

## 2021-12-27 PROCEDURE — 1036F TOBACCO NON-USER: CPT | Performed by: FAMILY MEDICINE

## 2021-12-27 PROCEDURE — 83036 HEMOGLOBIN GLYCOSYLATED A1C: CPT | Performed by: FAMILY MEDICINE

## 2021-12-27 PROCEDURE — G8484 FLU IMMUNIZE NO ADMIN: HCPCS | Performed by: FAMILY MEDICINE

## 2021-12-27 PROCEDURE — G8427 DOCREV CUR MEDS BY ELIG CLIN: HCPCS | Performed by: FAMILY MEDICINE

## 2021-12-27 PROCEDURE — 4040F PNEUMOC VAC/ADMIN/RCVD: CPT | Performed by: FAMILY MEDICINE

## 2021-12-27 RX ORDER — ATORVASTATIN CALCIUM 10 MG/1
10 TABLET, FILM COATED ORAL DAILY
Qty: 90 TABLET | Refills: 3 | Status: SHIPPED | OUTPATIENT
Start: 2021-12-27

## 2021-12-27 ASSESSMENT — ENCOUNTER SYMPTOMS
EYES NEGATIVE: 1
COUGH: 0
BLOOD IN STOOL: 0
CONSTIPATION: 0
DIARRHEA: 0
ALLERGIC/IMMUNOLOGIC NEGATIVE: 1
SHORTNESS OF BREATH: 0
ABDOMINAL PAIN: 0

## 2021-12-27 NOTE — PROGRESS NOTES
[facility]  [facility]  [address]  [facility] [address]  Dept: [phone]    12/27/2021    CHIEF COMPLAINT    Chief Complaint   Patient presents with    Diabetes     6month       HPI    Frank Albert is a 64 y.o. male who presents   Chief Complaint   Patient presents with    Diabetes     6month   . Recheck chronic conditions including diabetes and hyperlipidemia. Both have been well controlled with medication and lifestyle. He is active with walking. bs at home just over 100. bp 130 at home. History of aortic valve disorder with murmur. He has had an echo in the past and says it was unremarkable but is not available in his record. Vitals:    12/27/21 0902   BP: 120/60   Pulse: 63   Temp: 97.1 °F (36.2 °C)   SpO2: 99%   Weight: 134 lb 9.6 oz (61.1 kg)       REVIEW OF SYSTEMS    Review of Systems   Constitutional: Negative for fatigue, fever and unexpected weight change. HENT: Negative. Eyes: Negative. Respiratory: Negative for cough and shortness of breath. Cardiovascular: Negative for chest pain and leg swelling. Gastrointestinal: Negative for abdominal pain, blood in stool, constipation and diarrhea. Endocrine: Negative. Genitourinary: Negative for frequency and urgency. Musculoskeletal: Negative. Skin: Negative. Allergic/Immunologic: Negative. Neurological: Negative for dizziness and headaches. Hematological: Negative. Psychiatric/Behavioral: Negative for sleep disturbance. The patient is not nervous/anxious.         PAST MEDICAL HISTORY    Past Medical History:   Diagnosis Date    Diabetes type 2, controlled ([address]     Hyperlipidemia        FAMILY HISTORY    Family History   Problem Relation Age of Onset    Parkinsonism Father        SOCIAL HISTORY    Social History     Socioeconomic History    Marital status:      Spouse name: None    Number of children: 1    Years of education: None  Highest education level: None   Occupational History    Occupation: retired    Tobacco Use    Smoking status: Never Smoker    Smokeless tobacco: Never Used   Substance and Sexual Activity    Alcohol use: Never    Drug use: Never    Sexual activity: None   Other Topics Concern    None   Social History Narrative    None     Social Determinants of Health     Financial Resource Strain: Low Risk     Difficulty of Paying Living Expenses: Not hard at all   Food Insecurity: No Food Insecurity    Worried About Running Out of Food in the Last Year: Never true    [address] in the Last Year: Never true   Transportation Needs: No Transportation Needs    Lack of Transportation (Medical): No    Lack of Transportation (Non-Medical): No   Physical Activity:     Days of Exercise per Week: Not on file    Minutes of Exercise per Session: Not on file   Stress:     Feeling of Stress : Not on file   Social Connections:     Frequency of Communication with Friends and Family: Not on file    Frequency of Social Gatherings with Friends and Family: Not on file    Attends Zoroastrian Services: Not on file    Active Member of [address] [place] [address] or Organizations: Not on file    Attends Club or Organization Meetings: Not on file    Marital Status: Not on file   Intimate Partner Violence:     Fear of Current or Ex-Partner: Not on file    Emotionally Abused: Not on file    Physically Abused: Not on file    Sexually Abused: Not on file   Housing Stability:     Unable to Pay for Housing in the Last Year: Not on file    Number of [place] in the Last Year: Not on file    Unstable Housing in the Last Year: Not on file       SURGICAL HISTORY    Past Surgical History:   Procedure Laterality Date   Susan Stroud  2001    COLONOSCOPY  2014 2004.  Dr. Qureshi Mention at [address] [facility]    Current Outpatient Medications   Medication Sig Dispense Refill    metFORMIN (GLUCOPHAGE) 500 MG tablet Take 1 tablet by mouth 3 times daily (with meals) 270 tablet 3    atorvastatin (LIPITOR) 10 MG tablet Take 1 tablet by mouth daily 90 tablet 3    calcium carbonate 1500 (600 Ca) MG TABS tablet Take 600 mg by mouth daily (with breakfast)      Lancets MISC 1 each by Does not apply route 2 times daily 100 each 3    blood glucose monitor strips Test 3 times a day & as needed for symptoms of irregular blood glucose. 100 strip 0    glucose monitoring kit (FREESTYLE) monitoring kit 1 kit by Does not apply route daily 1 kit 0    glucose monitoring kit (FREESTYLE) monitoring kit 1 kit by Does not apply route daily 1 kit 1     No current facility-administered medications for this visit. ALLERGIES    Allergies   Allergen Reactions    No Known Allergies        PHYSICAL EXAM   Physical Exam  Vitals reviewed. Constitutional:       Appearance: He is well-developed. HENT:      Head: Normocephalic. Eyes:      Conjunctiva/sclera: Conjunctivae normal.      Pupils: Pupils are equal, round, and reactive to light. Neck:      Thyroid: No thyromegaly. Cardiovascular:      Rate and Rhythm: Normal rate and regular rhythm. Heart sounds: Murmur heard. Systolic murmur is present with a grade of 3/6. Comments: Murmur at rt sternal border  Pulmonary:      Effort: Pulmonary effort is normal.      Breath sounds: Normal breath sounds. No wheezing or rales. Abdominal:      Palpations: Abdomen is soft. Tenderness: There is no abdominal tenderness. There is no guarding or rebound. Musculoskeletal:         General: No tenderness or deformity. Normal range of motion. Cervical back: Normal range of motion and neck supple. Right lower leg: No edema. Left lower leg: No edema. Lymphadenopathy:      Cervical: No cervical adenopathy. Skin:     General: Skin is warm and dry. Neurological:      Mental Status: He is alert and oriented to person, place, and time.    Psychiatric:         Mood and Affect: Mood normal. Behavior: Behavior normal.         Thought Content: Thought content normal.         Judgment: Judgment normal.         ASSESSMENT/PLAN  1. Controlled type 2 diabetes mellitus without complication, without long-term current use of insulin ([facility]  Results for orders placed or performed in visit on 12/27/21   POCT glycosylated hemoglobin (Hb A1C)   Result Value Ref Range    Hemoglobin A1C 6.3 %   Chronic and stable. Continue medication and lifestyle changes. Continue to monitor blood sugar at home and report levels of concern    - metFORMIN (GLUCOPHAGE) 500 MG tablet; Take 1 tablet by mouth 3 times daily (with meals)  Dispense: 270 tablet; Refill: 3  - Comprehensive Metabolic Panel; Future  - Lipid Panel; Future  - POCT glycosylated hemoglobin (Hb A1C)    2. Mixed hyperlipidemia  Chronic, stable on statin. Will repeat labs prior to next appointment in 6 months  - atorvastatin (LIPITOR) 10 MG tablet; Take 1 tablet by mouth daily  Dispense: 90 tablet; Refill: 3  - Comprehensive Metabolic Panel; Future  - Lipid Panel; Future    3. Aortic heart murmur  No current symptoms. We will try to get prior echo report       Ryan Barroso received counseling on the following healthy behaviors: nutrition, exercise and medication adherence  Reviewed prior labs and health maintenance. Continue current medications, diet and exercise. Discussed use, benefit, and side effects of prescribed medications. Barriers to medication compliance addressed. Patient given educational materials - see patient instructions. All patient questions answered. Patient voiced understanding. Return in about 6 months (around 6/27/2022) for diabetes, labs.         Electronically signed by Nelson Downing MD on 12/27/21 at 9:04 AM EST

## 2021-12-28 DIAGNOSIS — E11.9 CONTROLLED TYPE 2 DIABETES MELLITUS WITHOUT COMPLICATION, WITHOUT LONG-TERM CURRENT USE OF INSULIN (HCC): ICD-10-CM

## 2021-12-28 RX ORDER — BLOOD-GLUCOSE METER
KIT MISCELLANEOUS
Qty: 100 STRIP | Refills: 0 | Status: SHIPPED | OUTPATIENT
Start: 2021-12-28 | End: 2022-06-27 | Stop reason: SDUPTHER

## 2021-12-28 RX ORDER — LANCETS 33 GAUGE
EACH MISCELLANEOUS
Qty: 100 EACH | Refills: 3 | Status: SHIPPED | OUTPATIENT
Start: 2021-12-28 | End: 2022-06-27 | Stop reason: SDUPTHER

## 2021-12-28 NOTE — TELEPHONE ENCOUNTER
Pharm requested    Health Maintenance   Topic Date Due    Shingles Vaccine (2 of 3) 04/08/2013    Pneumococcal 65+ years Vaccine (2 of 2 - PPSV23) 12/17/2016    Annual Wellness Visit (AWV)  Never done    COVID-19 Vaccine (3 - Booster for Moderna series) 08/25/2021    Lipid screen  07/01/2022    DTaP/Tdap/Td vaccine (3 - Td or Tdap) 03/14/2028    Flu vaccine  Completed    Hepatitis A vaccine  Aged Out    Hib vaccine  Aged Out    Meningococcal (ACWY) vaccine  Aged Out    Hepatitis C screen  Discontinued             (applicable per patient's age: Cancer Screenings, Depression Screening, Fall Risk Screening, Immunizations)    Hemoglobin A1C (%)   Date Value   12/27/2021 6.3   07/01/2021 6.0   12/21/2020 6.2 (H)     LDL Cholesterol (mg/dL)   Date Value   07/01/2021 57     AST (U/L)   Date Value   07/01/2021 19     ALT (U/L)   Date Value   07/01/2021 13     BUN (mg/dL)   Date Value   07/01/2021 14      (goal A1C is < 7)   (goal LDL is <100) need 30-50% reduction from baseline     BP Readings from Last 3 Encounters:   12/27/21 120/60   06/28/21 130/60   04/27/21 (!) 144/72    (goal /80)      All Future Testing planned in [facility]:  Lab Frequency Next Occurrence   Comprehensive Metabolic Panel Once 71/01/7531   Lipid Panel Once 06/27/2022       Next Visit Date:  Future Appointments   Date Time Provider Rodolfo Gaitan   6/27/2022  9:00 AM MD Fatou Lance Sicks            Patient Active Problem List:     Diabetes type 2, controlled ([facility]     Hyperlipidemia     Aortic heart murmur

## 2022-06-14 ENCOUNTER — HOSPITAL ENCOUNTER (OUTPATIENT)
Age: 77
Setting detail: SPECIMEN
Discharge: HOME | End: 2022-06-14

## 2022-06-14 DIAGNOSIS — E78.2 MIXED HYPERLIPIDEMIA: ICD-10-CM

## 2022-06-14 DIAGNOSIS — E11.9 CONTROLLED TYPE 2 DIABETES MELLITUS WITHOUT COMPLICATION, WITHOUT LONG-TERM CURRENT USE OF INSULIN (HCC): ICD-10-CM

## 2022-06-14 LAB
ALBUMIN SERPL-MCNC: 4.4 G/DL (ref 3.5–5.2)
ALBUMIN/GLOBULIN RATIO: 1.8 (ref 1–2.5)
ALP BLD-CCNC: 64 U/L (ref 40–129)
ALT SERPL-CCNC: 11 U/L (ref 5–41)
ANION GAP SERPL CALCULATED.3IONS-SCNC: 13 MMOL/L (ref 9–17)
AST SERPL-CCNC: 19 U/L
BILIRUB SERPL-MCNC: 0.5 MG/DL (ref 0.3–1.2)
BUN BLDV-MCNC: 16 MG/DL (ref 8–23)
CALCIUM SERPL-MCNC: 9.2 MG/DL (ref 8.6–10.4)
CHLORIDE BLD-SCNC: 99 MMOL/L (ref 98–107)
CHOLESTEROL/HDL RATIO: 2.3
CHOLESTEROL: 114 MG/DL
CO2: 26 MMOL/L (ref 20–31)
CREAT SERPL-MCNC: 1.03 MG/DL (ref 0.7–1.2)
GFR AFRICAN AMERICAN: >60 ML/MIN
GFR NON-AFRICAN AMERICAN: >60 ML/MIN
GFR SERPL CREATININE-BSD FRML MDRD: NORMAL ML/MIN/{1.73_M2}
GLUCOSE BLD-MCNC: 90 MG/DL (ref 70–99)
HDLC SERPL-MCNC: 49 MG/DL
LDL CHOLESTEROL: 51 MG/DL (ref 0–130)
POTASSIUM SERPL-SCNC: 4.4 MMOL/L (ref 3.7–5.3)
SODIUM BLD-SCNC: 138 MMOL/L (ref 135–144)
TOTAL PROTEIN: 6.8 G/DL (ref 6.4–8.3)
TRIGL SERPL-MCNC: 69 MG/DL

## 2022-06-27 ENCOUNTER — HOSPITAL ENCOUNTER (OUTPATIENT)
Age: 77
Setting detail: SPECIMEN
Discharge: HOME | End: 2022-06-27

## 2022-06-27 ENCOUNTER — OFFICE VISIT (OUTPATIENT)
Dept: FAMILY MEDICINE CLINIC | Age: 77
End: 2022-06-27
Payer: MEDICARE

## 2022-06-27 VITALS
WEIGHT: 134 LBS | HEART RATE: 72 BPM | DIASTOLIC BLOOD PRESSURE: 70 MMHG | SYSTOLIC BLOOD PRESSURE: 136 MMHG | BODY MASS INDEX: 22.65 KG/M2

## 2022-06-27 DIAGNOSIS — Z12.5 SCREENING FOR PROSTATE CANCER: ICD-10-CM

## 2022-06-27 DIAGNOSIS — E78.2 MIXED HYPERLIPIDEMIA: ICD-10-CM

## 2022-06-27 DIAGNOSIS — E11.9 CONTROLLED TYPE 2 DIABETES MELLITUS WITHOUT COMPLICATION, WITHOUT LONG-TERM CURRENT USE OF INSULIN (HCC): ICD-10-CM

## 2022-06-27 DIAGNOSIS — H91.91 HEARING LOSS OF RIGHT EAR, UNSPECIFIED HEARING LOSS TYPE: ICD-10-CM

## 2022-06-27 DIAGNOSIS — R26.89 BALANCE DISORDER: ICD-10-CM

## 2022-06-27 DIAGNOSIS — E11.9 CONTROLLED TYPE 2 DIABETES MELLITUS WITHOUT COMPLICATION, WITHOUT LONG-TERM CURRENT USE OF INSULIN (HCC): Primary | ICD-10-CM

## 2022-06-27 PROBLEM — S09.90XA HEAD INJURY: Status: ACTIVE | Noted: 2020-01-01

## 2022-06-27 LAB
ESTIMATED AVERAGE GLUCOSE: 131 MG/DL
HBA1C MFR BLD: 6.2 % (ref 4–6)
PROSTATE SPECIFIC ANTIGEN: 2.37 NG/ML

## 2022-06-27 PROCEDURE — G8420 CALC BMI NORM PARAMETERS: HCPCS | Performed by: FAMILY MEDICINE

## 2022-06-27 PROCEDURE — 1123F ACP DISCUSS/DSCN MKR DOCD: CPT | Performed by: FAMILY MEDICINE

## 2022-06-27 PROCEDURE — 99214 OFFICE O/P EST MOD 30 MIN: CPT | Performed by: FAMILY MEDICINE

## 2022-06-27 PROCEDURE — G8427 DOCREV CUR MEDS BY ELIG CLIN: HCPCS | Performed by: FAMILY MEDICINE

## 2022-06-27 PROCEDURE — 1036F TOBACCO NON-USER: CPT | Performed by: FAMILY MEDICINE

## 2022-06-27 RX ORDER — LISINOPRIL 2.5 MG/1
2.5 TABLET ORAL DAILY
Qty: 90 TABLET | Refills: 3 | Status: SHIPPED | OUTPATIENT
Start: 2022-06-27

## 2022-06-27 RX ORDER — BLOOD-GLUCOSE METER
KIT MISCELLANEOUS
Qty: 100 STRIP | Refills: 3 | Status: SHIPPED | OUTPATIENT
Start: 2022-06-27

## 2022-06-27 RX ORDER — LANCETS 33 GAUGE
EACH MISCELLANEOUS
Qty: 100 EACH | Refills: 3 | Status: SHIPPED | OUTPATIENT
Start: 2022-06-27

## 2022-06-27 SDOH — ECONOMIC STABILITY: FOOD INSECURITY: WITHIN THE PAST 12 MONTHS, YOU WORRIED THAT YOUR FOOD WOULD RUN OUT BEFORE YOU GOT MONEY TO BUY MORE.: NEVER TRUE

## 2022-06-27 SDOH — ECONOMIC STABILITY: FOOD INSECURITY: WITHIN THE PAST 12 MONTHS, THE FOOD YOU BOUGHT JUST DIDN'T LAST AND YOU DIDN'T HAVE MONEY TO GET MORE.: NEVER TRUE

## 2022-06-27 ASSESSMENT — ENCOUNTER SYMPTOMS
COUGH: 0
DIARRHEA: 0
SHORTNESS OF BREATH: 0
BLOOD IN STOOL: 0
ALLERGIC/IMMUNOLOGIC NEGATIVE: 1
ABDOMINAL PAIN: 0
CONSTIPATION: 0
EYES NEGATIVE: 1

## 2022-06-27 ASSESSMENT — PATIENT HEALTH QUESTIONNAIRE - PHQ9
SUM OF ALL RESPONSES TO PHQ QUESTIONS 1-9: 0
1. LITTLE INTEREST OR PLEASURE IN DOING THINGS: 0
2. FEELING DOWN, DEPRESSED OR HOPELESS: 0
SUM OF ALL RESPONSES TO PHQ QUESTIONS 1-9: 0
SUM OF ALL RESPONSES TO PHQ9 QUESTIONS 1 & 2: 0
SUM OF ALL RESPONSES TO PHQ QUESTIONS 1-9: 0
SUM OF ALL RESPONSES TO PHQ QUESTIONS 1-9: 0

## 2022-06-27 ASSESSMENT — SOCIAL DETERMINANTS OF HEALTH (SDOH): HOW HARD IS IT FOR YOU TO PAY FOR THE VERY BASICS LIKE FOOD, HOUSING, MEDICAL CARE, AND HEATING?: NOT HARD AT ALL

## 2022-06-27 NOTE — PROGRESS NOTES
[address] \[facility]\"" [address]  Dept: [phone]    6/27/2022    CHIEF COMPLAINT    Chief Complaint   Patient presents with    Diabetes       HPI    Colette Arreaga is a 64 y.o. male who presents   Chief Complaint   Patient presents with    Diabetes   . Recheck diabetes and hyperlipidemia. Taking metformin and Lipitor with no side effects. Not currently on ACE or ARB. Blood pressure has been normal at home and in the office. He is active with walking daily. Concerned with increased feeling of being off balance. Feels like he cannot walk in a straight line. He has fallen a few times in the past.      Vitals:    06/27/22 0902   BP: 136/70   Pulse: 72   Weight: 134 lb (60.8 kg)       REVIEW OF SYSTEMS    Review of Systems   Constitutional: Negative for fatigue, fever and unexpected weight change. HENT: Positive for hearing loss. Eyes: Negative. Respiratory: Negative for cough and shortness of breath. Cardiovascular: Negative for chest pain and leg swelling. Gastrointestinal: Negative for abdominal pain, blood in stool, constipation and diarrhea. Endocrine: Negative. Genitourinary: Negative for frequency and urgency. Musculoskeletal: Negative. Skin: Negative. Allergic/Immunologic: Negative. Neurological: Negative for dizziness and headaches. Balance feels off   Hematological: Negative. Psychiatric/Behavioral: Negative for sleep disturbance. The patient is not nervous/anxious.         PAST MEDICAL HISTORY    Past Medical History:   Diagnosis Date    Diabetes type 2, controlled ([address]     Head injury 2020    closed head injury    Hyperlipidemia     Subdural hematoma ([address] 1980    aparna hole left parietal       FAMILY HISTORY    Family History   Problem Relation Age of Onset    Parkinsonism Father        SOCIAL HISTORY    Social History     Socioeconomic History    Marital status:      Spouse name: None    Number of children: 1    Years of education: None    Highest education level: None   Occupational History    Occupation: retired    Tobacco Use    Smoking status: Never Smoker    Smokeless tobacco: Never Used   Substance and Sexual Activity    Alcohol use: Never    Drug use: Never    Sexual activity: None   Other Topics Concern    None   Social History Narrative    None     Social Determinants of Health     Financial Resource Strain: Low Risk     Difficulty of Paying Living Expenses: Not hard at all   Food Insecurity: No Food Insecurity    Worried About Running Out of Food in the Last Year: Never true    [address] in the Last Year: Never true   Transportation Needs:     Lack of Transportation (Medical): Not on file    Lack of Transportation (Non-Medical): Not on file   Physical Activity:     Days of Exercise per Week: Not on file    Minutes of Exercise per Session: Not on file   Stress:     Feeling of Stress : Not on file   Social Connections:     Frequency of Communication with Friends and Family: Not on file    Frequency of Social Gatherings with Friends and Family: Not on file    Attends [employer] Services: Not on file    Active Member of [address] [place] [address] or Organizations: Not on file    Attends Club or Organization Meetings: Not on file    Marital Status: Not on file   Intimate Partner Violence:     Fear of Current or Ex-Partner: Not on file    Emotionally Abused: Not on file    Physically Abused: Not on file    Sexually Abused: Not on file   Housing Stability:     Unable to Pay for Housing in the Last Year: Not on file    Number of [place] in the Last Year: Not on file    Unstable Housing in the Last Year: Not on file       SURGICAL HISTORY    Past Surgical History:   Procedure Laterality Date   Deisy Lomax  2001    COLONOSCOPY  2014 2004.  Dr. Areli Farah at [facility]    Current Outpatient Medications and time. Psychiatric:         Mood and Affect: Mood normal.         Behavior: Behavior normal.         Thought Content: Thought content normal.         Judgment: Judgment normal.         ASSESSMENT/PLAN  1. Controlled type 2 diabetes mellitus without complication, without long-term current use of insulin ([facility])  Recheck A1c. Continue testing blood sugar and following low-carb diet. Continue metformin. Suggested adding ACE for good blood pressure control and renal protection.  - Hemoglobin A1C; Future  - Easy Touch Lancets 33G/Twist MISC; Test bs 2-3 times daily  Dispense: 100 each; Refill: 3  - blood glucose test strips (FREESTYLE LITE) strip; USE ONE STRIP TO TEST THREE TIMES A DAY AND AS NEEDED FOR SYMPTOMS OF IRREGULAR BLOOD GLUCOSE  Dispense: 100 strip; Refill: 3  - lisinopril (PRINIVIL;ZESTRIL) 2.5 MG tablet; Take 1 tablet by mouth daily  Dispense: 90 tablet; Refill: 3    2. Mixed hyperlipidemia  Continue statin. Labs are up-to-date and reviewed with patient    3. Screening for prostate cancer    - PSA Screening; Future    4. Balance disorder    - [facility] - [place]    5. Hearing loss of right ear, unspecified hearing loss type    - [facility] - NENO Lewis, Audiology, [facility] received counseling on the following healthy behaviors: nutrition, exercise and medication adherence  Reviewed prior labs and health maintenance. Continue current medications, diet and exercise. Discussed use, benefit, and side effects of prescribed medications. Barriers to medication compliance addressed. Patient given educational materials - see patient instructions. All patient questions answered. Patient voiced understanding.       Return in about 6 months (around 12/27/2022) for medicare annual.        Electronically signed by Narinder Victoria MD on 6/27/22 at 9:08 AM EDT

## 2022-06-30 ENCOUNTER — HOSPITAL ENCOUNTER (OUTPATIENT)
Dept: PHYSICAL THERAPY | Facility: CLINIC | Age: 77
Setting detail: THERAPIES SERIES
Discharge: HOME | End: 2022-06-30
Payer: MEDICARE

## 2022-06-30 PROCEDURE — 97110 THERAPEUTIC EXERCISES: CPT

## 2022-06-30 PROCEDURE — 97161 PT EVAL LOW COMPLEX 20 MIN: CPT

## 2022-06-30 NOTE — FLOWSHEET NOTE
[] [facility]  Outpatient Rehabilitation &  Therapy  [facility] [address]   [place]: [phone]  F: [phone]     THERAPY RESPONSIBILITY OF CARE TRANSFER FORM       PATIENT NAME: Dionisio Green  MRN: [id number]   : 1945      [facility]AC[facility]    [facility]   [] [facility]   []  [facility]   [x] Dimitri cheung PT   [] Pediatrics   [] [facility]   [] [facility]    [] Other:       ACCEPTING FACILITY   [] [facility]   [] [facility]   []  [facility]   [x] Dimitri burnmelany PT   [] [facility]s   [] [facility]   [] [facility] Out[facility]    [] Other:           REASON FOR TRANSFER: Covering for primary therapist. Transferring care to primary PT.      TRANSFER OF CARE:    I am transferring the care of the above patient to: Juan Alberto Marr, PT  Calli Scheuermann, PT  2022      ACCEPTANCE OF CARE:     I am accepting the care of the above patient.  **, PT

## 2022-06-30 NOTE — CARE COORDINATION
Medicare Cap     [x] Physical Therapy  [] Speech Therapy  [] Occupational therapy  *PT and Speech caps combine      $2150 Limit for PT and Speech combined  $2150 Limit for OT individually  At the beginning of the month where you expect to go over $2150, please add the [address] modifier      Patient Name: Kailey Barley: 1945    Note:  This is an estimate of charges billed.      Date of [address] Name # units/ charge $$$ charge Daily Total Charge Ongoing Total $$$   6/30/22 eval low complex  therex 1  1 98.01+22.84 120.85

## 2022-06-30 NOTE — CONSULTS
[x] THE [facility] &  Therapy  [facility] [address]   [place]: [phone]  F: [phone]     Physical Therapy Evaluation    Date:  2022  Patient: Arsen Mueller  : 1945  MRN: [id number]  Physician: Khris Reeder MD  Insurance: Medicare (based on Med [employer]) (Secondary: Medical [place])  Medical Diagnosis: R26.89 (ICD-10-CM) - Balance disorder   Rehab Codes: M62.81 (Muscle Weakness), R29.3 (Poor Posture), R26.89 (Difficulty Walking), Z91.81, M62.81, R26.89  Onset Date: 22  Next 's appt.: TBD      Subjective:   CC/HPI: Patient is a 64year old male who presents to OP physical therapy for an initial evaluation of balance deficits of insidious onset occurring over a year ago secondary to a fall on a curb. States that sometime in the last year he was walking and stepped up into the street to the curb up onto the sidewalk and fell down. His R leg got caught on the curb and hit his head when he fell. States that when he fell he was unconscious for a while. He was able to regain consciousness and get up by the time the ambulance came. States he did not go to the hospital and that he was able to go back to his car to drive home. Patient reports that he tries to avoid sidewalks because he has difficulty stepping up on the curb in unfamiliar environment. States that he has difficulty with balance when he is turning right and thinks he has some weakness in his right leg. Patient denies dizziness and blurry vision. States overall he is active, walks a few miles daily, and has no problem with his every day activities. Overall, he does not feel confident in unfamiliar situations and environments during ambulation.       PMHx:  Past Medical History       Date Comments     Diabetes type 2, controlled ([facility] [address] [E11.9]       Hyperlipidemia [E78.5]       Head injury [S09.90XA]  closed head injury     Subdural hematoma ([facility] [address] [F29.7V5W]       Past Surgical History Laterality Date Comments   Henok Butterfield [XIB405]  2001    Colonoscopy [BYV458]  2014 2004.  Dr. Joseph Yin at [facility]    [] Unremarkable               [x] Refer to full medical chart  In EPIC     Comorbidities:   [] Obesity [] Dialysis  [] N/A   [] Asthma/COPD [] Dementia [] Other:   [] Stroke [] Sleep apnea [] Other:   [] Vascular disease [] Rheumatic disease [] Other:     Medications: [x] Refer to full medical record [] None [] Other:  Allergies:      [x] Refer to full medical record [] None [] Other:    ADL/IADL [x] Previously independent with all [x] Currently independent with all Who currently assists the patient with task     [] Previously independent with all except: [] Currently independent with all except:     Bathing  [] Assist [] Assist     Dress/grooming [] Assist [] Assist     Transfer/mobility [] Assist [] Assist     Feeding [] Assist [] Assist     Toileting [] Assist [] Assist     Driving [] Assist [] Assist     Housekeeping [] Assist [] Assist     Grocery shop/meal prep [] Assist [] Assist        Gait Prior level of function Current level of function    [x] Independent  [] Assist [x] Independent  [] Assist   Device: [x] Independent [x] Independent    [] Straight Cane [] Quad cane [] Straight Cane [] Quad cane    [] Standard walker [] Rolling walker   [] 4 wheeled walker [] Standard walker [] Rolling walker   [] 4 wheeled walker    [] Wheelchair [] Wheelchair     Function:  Hand Dominance  [x] Right  [] Left  Marital Status Has help at home, spouse + daughter   Home type 2 story,   Uses stairs at home with railing    Stairs inside Full flight   Employment Retired   Recreational Activities Travel, walking       Objective:      OBSERVATION No Deficit Deficit Not Tested Comments   Posture       Forward Head [] [x] []    Rounded Shoulders [] [x] []    Kyphosis [x] [] []    Lordosis [x] [] []    Slumped sitting [x] [] []    Palpation [x] [] []    Sensation [x] [] []    Edema [x] [] []    Gait analysis: [] [x] [] Decreased gait speed, decreased step height bilaterally, decreased step length L>R, wide JODEE         STRENGTH    Left Right   Hip Flex 5 4+   ABD 4 3+   ADD 4+ 4+   Knee Flex 5 5   Ext 5 5   Ankle DF 4+ 4   PF 4+ 4+            FUNCTION Normal Difficult Unable   Sitting [x] [] []   Standing [x] [] []   Ambulation [x] [] []   Groom/Dress [x] [] []   Lift/Carry [x] [] []   Stairs [x] [] []   Bending [x] [] []   OH reach [x] [] []   Sit to Stand [x] [] []       Functional Test:   FGA  5TSTS  TUG Score:   20/30  15.25 seconds  13.2 seconds   FGA cutoff score for fall risk: 22/30  5TSTS cutoff score for fall risk in ages 66-77: 12.6 seconds          [place] Fall Risk Assessment    Risk Factor Scale  Score   History of Falls [x] Yes  [] No 25  0    Secondary Diagnosis [] Yes  [x] No 15  0    Ambulatory Aid [] Furniture  [] Crutches/cane/walker  [x] None/bedrest/wheelchair/nurse 30  15  0    IV/Heparin Lock [] Yes  [x] No 20  0    Gait/Transferring [] Impaired  [] Weak  [x] Normal/bedrest/immobile 20  10  0    Mental Status [] Forgets limitations  [x] Oriented to own ability 15  0       25      Based on the Assessment score: check the appropriate box. []  No intervention needed   Low =   Score of 0-24    [x]  Use standard prevention interventions Moderate =  Score of 24-44   [x] Give patient handout and discuss fall prevention strategies   [x] Establish goal of education for patient/family RE: fall prevention strategies    []  Use high risk prevention interventions High = Score of 45 and higher   [] Give patient handout and discuss fall prevention strategies   [] Establish goal of education for patient/family Re: fall prevention strategies   [] Discuss lifeline / other resources    Assessment:  Patient is a 64year old male who presents to OP physical therapy for an initial evaluation of balance deficits of insidious onset occurring over a year ago secondary to a fall on a curb.  Patient presents with and verbalizes overall decreased confidence and increased anxiousness due to hx of falls in different environments negotiating objects. Patient demonstrates a fall risk by having previous hx of falls along with an missed cutoff score in the FGA and 5TSTS for fall risk. Patient also exhibits grossly limited LE strength with R being more impaired than L likely contributing to unsteadiness. Patient would benefit from skilled physical therapy services in order to: improve LE strength and power, improve balance and steadiness in a variety of environments, and improve overall gait mechanics to return to [place] and improve confidence in community. Problems:    [x] ? LE Power: 5TSTS 15.25 seconds, fall risk  [x] ? Strength: LE strength limited grossly R>L  [x] ? Balance: dynamic balance assessed with FGA 20/30, fall risk  [x] Other: Gait: Decreased gait speed, decreased step height bilaterally, decreased step length L>R, wide JODEE      Goals  MET NOT MET ON-  GOING  Details   Date Addressed:        STG: To be met in 5 treatments           1. ? Strength: Pt will demonstrate improved bilateral LE strength to 4+/5 or greater in order to demonstrate improved stability/strength necessary for unrestricted ADLs/work activities []  []  []      2. Independent with Home Exercise Programs []  []  []     3. Demonstrate knowledge of fall risk prevention  []  []  []      []  []  []     Date Addressed:        LTG: To be met in 10 treatments       1. Patient to improve 5TSTS score to 12.6 seconds or lower in order to demonstrate improved LE power and decrease risk of falls []  []  []     2. Patient to improve FGA score to at least 24/30 in order to reduce fall risk and improve dynamic balance during gait. []  []  []     3. Patient to demonstrate ambulation with improved gait speed, narrow JODEE, and increased step height in order to improve QOL and improve confidence. []  []  []     4.  Patient to ascend flight of stairs with 1 hand rail or less in order to improve confidence with stair negotiation and demonstrate improved LE functional strength. Patient goals: improved balance and muscle strength    Rehab Potential:  [x] Good  [] Fair  [] Poor   Suggested Professional Referral:  [x] No  [] Yes:  Barriers to Goal Achievement[de-identified]  [x] No  [] Yes:  Domestic Concerns:  [x] No  [] Yes:    Pt. Education:  [x] Plans/Goals, Risks/Benefits discussed  [x] Home exercise program  Access Code: [email or web address]  URL: [email or web address]  Date: 06/30/2022  Prepared by: Calli Scheuermann    Exercises  Standing Heel Raise - 1 x daily - 7 x weekly - 2 sets - 10 reps  Standing March with Counter Support - 1 x daily - 7 x weekly - 2 sets - 10 reps  Hip Extension with Resistance Loop - 1 x daily - 7 x weekly - 2 sets - 10 reps  Standing Hip Extension with Anchored Resistance - 1 x daily - 7 x weekly - 2 sets - 10 reps  Standing Hip Abduction with Anchored Resistance - 1 x daily - 7 x weekly - 2 sets - 10 reps  Standing Hip Flexion with Resistance Loop - 1 x daily - 7 x weekly - 2 sets - 10 reps    Patient Education  What You Can Do to Prevent Falls  Method of Education: [x] Verbal  [x] Demo  [x] Written  Comprehension of Education:  [x] Verbalizes understanding. [x] Demonstrates understanding. [x] Needs Review. [] Demonstrates/verbalizes understanding of HEP/Ed previously given.     Treatment Plan:  reatment Plan:  [x] Therapeutic Exercise   11036  [] Iontophoresis: 4 mg/mL Dexamethasone Sodium Phosphate  mAmin  14301   [x] Therapeutic Activity  60864 [] Vasopneumatic cold with compression  87732    [x] Gait Training   93209 [] Ultrasound   94431   [x] Neuromuscular Re-education  90367 [] Electrical Stimulation Unattended  41839   [] Manual Therapy  83104 [] Electrical Stimulation Attended  57990   [x] Instruction in HEP  [] Lumbar/Cervical Traction  18850   [] Aquatic Therapy   15270 [] Cold/hotpack    [] Massage   35322      [] Dry Needling, 1 or 2 muscles  77607   [] Biofeedback, first 15 minutes   90912  [] Biofeedback, additional 15 minutes   90913 [] Dry Needling, 3 or more muscles  20561       []  Medication allergies reviewed for use of    Dexamethasone Sodium Phosphate 4mg/ml     with iontophoresis treatments. Pt is not allergic.     Frequency:  2 x/week for 10 visits      Todays Treatment:    Exercises:  Exercise    Unsteadiness in Gait Reps/ Time Weight/ Level Comments         Standing      4 - way hip 2x10 orange HEP   Standing marches 2x10 // bars HEP   Heel raises 2x10 // bars HEP   Paloff Presses   Resistance band   Rows   Resistance band   Extension   Resistance band   Step ups  Fwd/lat          TG Squats            Balance training      Balance board      Foam nBOS      Tandem stance      nBOS EC flat surface      SLS             Gait training      Gait over obstacles      Gait with head turns      Gait with high march      Gait in/out of hurdles   Fwd/lat    Tandem walking      Curb negotiation outside            Other:    Specific Instructions for next treatment: improve balance, change environment for ambulation, improve LE strength,      Evaluation Complexity:  History (Personal factors, comorbidities) [] 0 [x] 1-2 [] 3+   Exam (limitations, restrictions) [] 1-2 [] 3 [x] 4+   Clinical presentation (progression) [x] Stable [] Evolving  [] Unstable   Decision Making [x] Low [] Moderate [] High    [x] Low Complexity [] Moderate Complexity [] High Complexity       Treatment Charges: Mins Units   [x] Evaluation       [x]  Low       []  Moderate       []  High 40 1   []  Modalities     [x]  Ther Exercise 10 1   []  Manual Therapy     []  Ther Activities     []  Aquatics     []  Vasocompression     []  Other       TOTAL TREATMENT TIME: 50 minutes    Time in:  0710     Time out: 0800    Electronically signed by: Gillian Alvarez PT    Physician Signature:________________________________Date:__________________  By signing above or cosigning this note, I have reviewed this plan of care and certify a need for medically necessary rehabilitation services.      *PLEASE SIGN ABOVE AND FAX BACK ALL PAGES*

## 2022-07-06 ENCOUNTER — HOSPITAL ENCOUNTER (OUTPATIENT)
Dept: PHYSICAL THERAPY | Facility: CLINIC | Age: 77
Setting detail: THERAPIES SERIES
Discharge: HOME | End: 2022-07-06
Payer: MEDICARE

## 2022-07-06 PROCEDURE — 97110 THERAPEUTIC EXERCISES: CPT

## 2022-07-06 NOTE — CARE COORDINATION
Medicare Cap     [x] Physical Therapy  [] Speech Therapy  [] Occupational therapy  *PT and Speech caps combine      $2150 Limit for PT and Speech combined  $2150 Limit for OT individually  At the beginning of the month where you expect to go over $2150, please add the [address] [place] [address] modifier      Patient Name: [place] Syed: 1945    Note:  This is an estimate of charges billed.      Date of [address] Name # units/ charge $$$ charge Daily Total Charge Ongoing Total $$$   6/30/22 eval low complex  therex 1  1 98.01+22.84 120.85    7/6 3 ex  25.10+19.62+19.62 64.34 185.19

## 2022-07-06 NOTE — FLOWSHEET NOTE
[x] [facility]  Outpatient Rehabilitation &  Therapy  [facility]   [place]: [phone]  F: [phone]      Physical Therapy Daily Treatment Note    Date:  2022  Patient Name:  Claudia Navarro    :  1945  MRN: [id number]  Physician: Bacilio Radford MD                   Insurance: Medicare (based on Med [employer]) (Secondary: Medical [place])  Medical Diagnosis: R26.89 (ICD-10-CM) - Balance disorder            Rehab Codes: M62.81 (Muscle Weakness), R29.3 (Poor Posture), R26.89 (Difficulty Walking), Z91.81, M62.81, R26.89  Onset Date: 22               Next 's appt.: TBD    Visit# / total visits: 2/10; Progress note for Medicare patient due at visit 10     Cancels/No Shows:     Subjective:    Pain:  [] Yes  [x] No Location:  N/A  Pain Rating: (0-10 scale) 0/10  Pain altered Tx:  [x] No  [] Yes  Action:  Comments: Patient arrived noting no new issues. Notes RLE still feels weaker than LLE.      Objective:  Exercises:  Exercise     Unsteadiness in Gait Reps/ Time Weight/ Level Comments         NuStep 10'               Standing         4 - way hip x10 orange HEP   Standing marches 2x10 // bars HEP   Heel raises 2x10 // bars HEP   Step ups Fwd/Lat  6\" 2x10               TG Squats  2x10 L20               Balance training         Balance board  3'  L2     Foam nBOS         Tandem stance         nBOS EC flat surface         SLS                    Gait training      Gait belt donned   Small hurdles Fwd  2L      Small hurdles Lateral  2L             Mat       Bridges 2x10      Clamshells 2x10     SL hip abd 2x10                            Other:     Specific Instructions for next treatment: improve balance, change environment for ambulation, improve LE strength,        Treatment Charges: Mins Units   []  Modalities     [x]  Ther Exercise 40 3   []  Manual Therapy     []  Ther Activities     []  Aquatics     []  Vasocompression     []  Other     Total Treatment time 40 3 of Education: [x] Verbal  [] Demo  [] Written  Comprehension of Education:  [x] Verbalizes understanding. [] Demonstrates understanding. [] Needs review. [x] Demonstrates/verbalizes HEP/Ed previously given. Plan: [x] Continue current frequency toward long and short term goals.     [x] Specific Instructions for subsequent treatments: progress strength and stability program.       Time In: 0900            Time Out: 0940    Electronically signed by:  Afsaneh Schmidt PTA

## 2022-07-08 ENCOUNTER — HOSPITAL ENCOUNTER (OUTPATIENT)
Dept: PHYSICAL THERAPY | Facility: CLINIC | Age: 77
Setting detail: THERAPIES SERIES
Discharge: HOME | End: 2022-07-08
Payer: MEDICARE

## 2022-07-08 PROCEDURE — 97110 THERAPEUTIC EXERCISES: CPT

## 2022-07-08 NOTE — FLOWSHEET NOTE
[x] [facility]  Outpatient Rehabilitation &  Therapy  [facility]   [place]: [phone]  F: [phone]      Physical Therapy Daily Treatment Note    Date:  2022  Patient Name:  Conner Martinez    :  1945  MRN: [id number]  Physician: Adryan Calvo MD                   Insurance: Medicare (based on Med [employer]) (Secondary: Medical [place])  Medical Diagnosis: R26.89 (ICD-10-CM) - Balance disorder            Rehab Codes: M62.81 (Muscle Weakness), R29.3 (Poor Posture), R26.89 (Difficulty Walking), Z91.81, M62.81, R26.89  Onset Date: 22               Next 's appt.: TBD    Visit# / total visits: 3/10; Progress note for Medicare patient due at visit 10     Cancels/No Shows:     Subjective:    Pain:  [] Yes  [x] No Location:  N/A  Pain Rating: (0-10 scale) 0/10  Pain altered Tx:  [x] No  [] Yes  Action:  Comments: Patient arrived noting minor soreness with progressions last visit with no lingering issues.      Objective:  Exercises:  Exercise     Unsteadiness in Gait Reps/ Time Weight/ Level Comments         NuStep 10'               Standing         4 - way hip x15 orange HEP   Standing marches 2x10 // bars HEP   Heel raises 2x10 // bars HEP   Step ups Fwd/Lat  6\" 2x10               TG Squats  2x10 L20               Balance training         Balance board  3'  L2     Foam nBOS         Tandem stance         nBOS EC flat surface         SLS                    Gait training      Gait belt donned   Small hurdles Fwd  2L      Small hurdles Lateral  2L             Mat       Bridges 2x10      Clamshells 2x10     SL hip abd 2x10                            Other:     Specific Instructions for next treatment: improve balance, change environment for ambulation, improve LE strength,        Treatment Charges: Mins Units   []  Modalities     [x]  Ther Exercise 40 3   []  Manual Therapy     []  Ther Activities     []  Aquatics     []  Vasocompression     []  Other     Total Treatment time 40 3       Assessment: [x] Progressing toward goals. Continued strength and stability progressions this date. Patient notes muscle soreness post treatment with no complaint of pain. Will continue to progress stability program and update HEP next visit. [] No change. [] Other:   [x] Patient would continue to benefit from skilled physical therapy services in order to: LE strength and power, improve balance and steadiness in a variety of environments, and improve overall gait mechanics to return to [facility] and improve confidence in community.       Goals  MET NOT MET ON-  GOING  Details   Date Addressed:            STG: To be met in 5 treatments  ?          1. ? Strength: Pt will demonstrate improved bilateral LE strength to 4+/5 or greater in order to demonstrate improved stability/strength necessary for unrestricted ADLs/work activities []? ?  []??  []??      2. Independent with Home Exercise Programs []? ?  []??  []??      3. Demonstrate knowledge of fall risk prevention  []? ?  []??  []??        []? ?  []??  []??      Date Addressed:            LTG: To be met in 10 treatments           1. Patient to improve 5TSTS score to 12.6 seconds or lower in order to demonstrate improved LE power and decrease risk of falls []? ?  []??  []??      2. Patient to improve FGA score to at least 24/30 in order to reduce fall risk and improve dynamic balance during gait. []??  []??  []??      3. Patient to demonstrate ambulation with improved gait speed, narrow JODEE, and increased step height in order to improve QOL and improve confidence. []??  []??  []??      4. Patient to ascend flight of stairs with 1 hand rail or less in order to improve confidence with stair negotiation and demonstrate improved LE functional strength.                 Patient goals: improved balance and muscle strength    Pt.  Education:  [x] Yes  [] No  [x] Reviewed Prior HEP/Ed  Method of Education: [x] Verbal  [] Demo  [] Written  Comprehension of Education:  [x] Verbalizes understanding. [] Demonstrates understanding. [] Needs review. [x] Demonstrates/verbalizes HEP/Ed previously given. Plan: [x] Continue current frequency toward long and short term goals.     [x] Specific Instructions for subsequent treatments: progress strength and stability program.       Time In: 0700            Time Out: 6806    Electronically signed by:  Boom Markham PTA

## 2022-07-11 ENCOUNTER — HOSPITAL ENCOUNTER (OUTPATIENT)
Dept: PHYSICAL THERAPY | Facility: CLINIC | Age: 77
Setting detail: THERAPIES SERIES
Discharge: HOME | End: 2022-07-11
Payer: MEDICARE

## 2022-07-11 PROCEDURE — 97110 THERAPEUTIC EXERCISES: CPT

## 2022-07-11 NOTE — CARE COORDINATION
Medicare Cap     [x] Physical Therapy  [] Speech Therapy  [] Occupational therapy  *PT and Speech caps combine      $2150 Limit for PT and Speech combined  $2150 Limit for OT individually  At the beginning of the month where you expect to go over $2150, please add the [address] modifier      Patient Name: Lucrecia Barakat: 1945    Note:  This is an estimate of charges billed.      Date of [address] Name # units/ charge $$$ charge Daily Total Charge Ongoing Total $$$   6/30/22 eval low complex  therex 1  1 98.01+22.84 120.85    7/6 3 ex  25.10+19.62+19.62 64.34 185.19   7/8 3 ex  25.10+19.62+19.62 64.34 249.53   7/11 3 ex  25.10+19.62+19.62 64.34 313.87

## 2022-07-11 NOTE — FLOWSHEET NOTE
[x] [facility]  Outpatient Rehabilitation &  Therapy  [facility]   Shantanu Linky: [phone]  F: [phone]      Physical Therapy Daily Treatment Note    Date:  2022  Patient Name:  Dionisio Green    :  1945  MRN: [id number]  Physician: Ann Mojica MD                   Insurance: Medicare (based on [employer]) (Secondary: [employer])  Medical Diagnosis: R26.89 (ICD-10-CM) - Balance disorder            Rehab Codes: M62.81 (Muscle Weakness), R29.3 (Poor Posture), R26.89 (Difficulty Walking), Z91.81, M62.81, R26.89  Onset Date: 22               Next 's appt.: TBD    Visit# / total visits: 4/10; Progress note for Medicare patient due at visit 10     Cancels/No Shows:     Subjective:    Pain:  [] Yes  [x] No Location:  N/A  Pain Rating: (0-10 scale) 0/10  Pain altered Tx:  [x] No  [] Yes  Action:  Comments: Patient arrived noting minor soreness post last treatment and continued weakness in RLE.      Objective:  Exercises:  Exercise     Unsteadiness in Gait Reps/ Time Weight/ Level Comments         NuStep 10'               Standing         4 - way hip  x15 orange HEP   Standing marches  2x10 // bars HEP   Heel raises  2x10 // bars HEP   Step ups Fwd/Lat  6\" 2x10     Lunges  2x10     SLS  3x20\"               TG Squats  2x10 L20               Balance training         Balance board  3'  L2     SLS   2x20\" ea                 Gait training      Gait belt donned   Small hurdles Fwd  2L      Small hurdles Lateral  2L             Mat       Bridges 2x10      Clamshells 2x10     SL hip abd 2x10                            Other:     Specific Instructions for next treatment: improve balance, change environment for ambulation, improve LE strength,        Treatment Charges: Mins Units   []  Modalities     [x]  Ther Exercise 40 3   []  Manual Therapy     []  Ther Activities     []  Aquatics     []  Vasocompression     []  Other     Total Treatment time 40 3       Assessment: [x] Progressing toward goals. Continued strength and stability progressions this date. Patient demonstrating improved balance and stability with balance board and hurdles this date. Issued updated HEP and resistance bands for home. Will continue to monitor response to treatment and advance as tolerated. [] No change. [] Other:   [x] Patient would continue to benefit from skilled physical therapy services in order to: LE strength and power, improve balance and steadiness in a variety of environments, and improve overall gait mechanics to return to PLOF and improve confidence in community.       Goals  MET NOT MET ON-  GOING  Details   Date Addressed:            STG: To be met in 5 treatments  ?          1. ? Strength: Pt will demonstrate improved bilateral LE strength to 4+/5 or greater in order to demonstrate improved stability/strength necessary for unrestricted ADLs/work activities []? ?  []??  []??      2. Independent with Home Exercise Programs []? ?  []??  []??      3. Demonstrate knowledge of fall risk prevention  []? ?  []??  []??        []? ?  []??  []??      Date Addressed:            LTG: To be met in 10 treatments           1. Patient to improve 5TSTS score to 12.6 seconds or lower in order to demonstrate improved LE power and decrease risk of falls []? ?  []??  []??      2. Patient to improve FGA score to at least 24/30 in order to reduce fall risk and improve dynamic balance during gait. []??  []??  []??      3. Patient to demonstrate ambulation with improved gait speed, narrow JODEE, and increased step height in order to improve QOL and improve confidence. []??  []??  []??      4. Patient to ascend flight of stairs with 1 hand rail or less in order to improve confidence with stair negotiation and demonstrate improved LE functional strength.                 Patient goals: improved balance and muscle strength    Pt.  Education:  [x] Yes  [] No  [x] Reviewed Prior HEP/Ed  Method of Education: [x] Verbal  [] Noah  [x] Written:   Access Code: [email or web address]  URL: [email or web address]  Date: 07/11/2022  Prepared by: Jayne Johnson    Exercises  Standing Hip Abduction with Anchored Resistance - 1 x daily - 7 x weekly - 3 sets - 10 reps  Standing Hip Extension with Anchored Resistance - 1 x daily - 7 x weekly - 3 sets - 10 reps  Standing Hip Adduction with Anchored Resistance - 1 x daily - 7 x weekly - 3 sets - 10 reps  Standing Repeated Hip Flexion with Resistance - 1 x daily - 7 x weekly - 3 sets - 10 reps  Sidelying Hip Abduction - 1 x daily - 7 x weekly - 3 sets - 10 reps  Clamshell - 1 x daily - 7 x weekly - 3 sets - 10 reps  Supine Bridge - 1 x daily - 7 x weekly - 3 sets - 10 reps  Single Leg Stance - 1 x daily - 7 x weekly - 3 sets - 10 reps  Standard Lunge - 1 x daily - 7 x weekly - 3 sets - 10 reps    Comprehension of Education:  [x] Verbalizes understanding. [] Demonstrates understanding. [] Needs review. [x] Demonstrates/verbalizes HEP/Ed previously given. Plan: [x] Continue current frequency toward long and short term goals.     [x] Specific Instructions for subsequent treatments: progress strength and stability program.       Time In: 0700              Time Out: 8457    Electronically signed by:  Daniel Pineda PTA

## 2022-07-15 ENCOUNTER — HOSPITAL ENCOUNTER (OUTPATIENT)
Dept: PHYSICAL THERAPY | Facility: CLINIC | Age: 77
Setting detail: THERAPIES SERIES
Discharge: HOME | End: 2022-07-15
Payer: MEDICARE

## 2022-07-15 PROCEDURE — 97110 THERAPEUTIC EXERCISES: CPT

## 2022-12-27 DIAGNOSIS — E78.2 MIXED HYPERLIPIDEMIA: ICD-10-CM

## 2022-12-27 DIAGNOSIS — E11.9 CONTROLLED TYPE 2 DIABETES MELLITUS WITHOUT COMPLICATION, WITHOUT LONG-TERM CURRENT USE OF INSULIN (HCC): ICD-10-CM

## 2022-12-27 RX ORDER — ATORVASTATIN CALCIUM 10 MG/1
TABLET, FILM COATED ORAL
Qty: 90 TABLET | Refills: 3 | Status: SHIPPED | OUTPATIENT
Start: 2022-12-27

## 2023-02-13 SDOH — ECONOMIC STABILITY: FOOD INSECURITY: WITHIN THE PAST 12 MONTHS, YOU WORRIED THAT YOUR FOOD WOULD RUN OUT BEFORE YOU GOT MONEY TO BUY MORE.: NEVER TRUE

## 2023-02-13 SDOH — ECONOMIC STABILITY: INCOME INSECURITY: HOW HARD IS IT FOR YOU TO PAY FOR THE VERY BASICS LIKE FOOD, HOUSING, MEDICAL CARE, AND HEATING?: NOT HARD AT ALL

## 2023-02-13 SDOH — ECONOMIC STABILITY: HOUSING INSECURITY
IN THE LAST 12 MONTHS, WAS THERE A TIME WHEN YOU DID NOT HAVE A STEADY PLACE TO SLEEP OR SLEPT IN A SHELTER (INCLUDING NOW)?: NO

## 2023-02-13 SDOH — ECONOMIC STABILITY: FOOD INSECURITY: WITHIN THE PAST 12 MONTHS, THE FOOD YOU BOUGHT JUST DIDN'T LAST AND YOU DIDN'T HAVE MONEY TO GET MORE.: NEVER TRUE

## 2023-02-14 ENCOUNTER — OFFICE VISIT (OUTPATIENT)
Dept: FAMILY MEDICINE CLINIC | Age: 78
End: 2023-02-14

## 2023-02-14 VITALS
WEIGHT: 134.6 LBS | DIASTOLIC BLOOD PRESSURE: 68 MMHG | HEIGHT: 65 IN | BODY MASS INDEX: 22.42 KG/M2 | HEART RATE: 70 BPM | SYSTOLIC BLOOD PRESSURE: 130 MMHG

## 2023-02-14 DIAGNOSIS — E78.2 MIXED HYPERLIPIDEMIA: ICD-10-CM

## 2023-02-14 DIAGNOSIS — H91.93 BILATERAL HEARING LOSS, UNSPECIFIED HEARING LOSS TYPE: ICD-10-CM

## 2023-02-14 DIAGNOSIS — Z23 NEED FOR PNEUMOCOCCAL VACCINATION: ICD-10-CM

## 2023-02-14 DIAGNOSIS — Z12.5 SCREENING FOR PROSTATE CANCER: ICD-10-CM

## 2023-02-14 DIAGNOSIS — E11.9 CONTROLLED TYPE 2 DIABETES MELLITUS WITHOUT COMPLICATION, WITHOUT LONG-TERM CURRENT USE OF INSULIN (HCC): Primary | ICD-10-CM

## 2023-02-14 LAB — HBA1C MFR BLD: 5.8 %

## 2023-02-14 RX ORDER — BLOOD-GLUCOSE METER
KIT MISCELLANEOUS
Qty: 100 STRIP | Refills: 3 | Status: SHIPPED | OUTPATIENT
Start: 2023-02-14

## 2023-02-14 SDOH — ECONOMIC STABILITY: FOOD INSECURITY: WITHIN THE PAST 12 MONTHS, THE FOOD YOU BOUGHT JUST DIDN'T LAST AND YOU DIDN'T HAVE MONEY TO GET MORE.: NEVER TRUE

## 2023-02-14 SDOH — ECONOMIC STABILITY: FOOD INSECURITY: WITHIN THE PAST 12 MONTHS, YOU WORRIED THAT YOUR FOOD WOULD RUN OUT BEFORE YOU GOT MONEY TO BUY MORE.: NEVER TRUE

## 2023-02-14 SDOH — ECONOMIC STABILITY: INCOME INSECURITY: HOW HARD IS IT FOR YOU TO PAY FOR THE VERY BASICS LIKE FOOD, HOUSING, MEDICAL CARE, AND HEATING?: NOT HARD AT ALL

## 2023-02-14 ASSESSMENT — PATIENT HEALTH QUESTIONNAIRE - PHQ9
SUM OF ALL RESPONSES TO PHQ QUESTIONS 1-9: 0
SUM OF ALL RESPONSES TO PHQ QUESTIONS 1-9: 0
1. LITTLE INTEREST OR PLEASURE IN DOING THINGS: 0
SUM OF ALL RESPONSES TO PHQ QUESTIONS 1-9: 0
SUM OF ALL RESPONSES TO PHQ9 QUESTIONS 1 & 2: 0
2. FEELING DOWN, DEPRESSED OR HOPELESS: 0
SUM OF ALL RESPONSES TO PHQ QUESTIONS 1-9: 0

## 2023-02-14 ASSESSMENT — ENCOUNTER SYMPTOMS
COUGH: 0
ALLERGIC/IMMUNOLOGIC NEGATIVE: 1
ABDOMINAL PAIN: 0
BLOOD IN STOOL: 0
EYES NEGATIVE: 1
CONSTIPATION: 0
DIARRHEA: 0
SHORTNESS OF BREATH: 0

## 2023-02-14 NOTE — PROGRESS NOTES
[address] [place] [address]  [address]  Dept: [phone]    2/14/2023    CHIEF COMPLAINT    Chief Complaint   Patient presents with    Diabetes       HPI    Millicent Mauricio is a 76 y.o. male who presents   Chief Complaint   Patient presents with    Diabetes   . Recheck chronic diabetes and hyperlipidemia. Taking ACE as prescribed, low-dose. Conditions are well controlled. He is walking daily. Following healthy low-carb diet. Vitals:    02/14/23 1120   BP: 130/68   Pulse: 70   Weight: 134 lb 9.6 oz (61.1 kg)   Height: 5' 4.5\" (1.638 m)       REVIEW OF SYSTEMS    Review of Systems   Constitutional:  Negative for fatigue, fever and unexpected weight change. HENT: Negative. Eyes: Negative. Respiratory:  Negative for cough and shortness of breath. Cardiovascular:  Negative for chest pain and leg swelling. Gastrointestinal:  Negative for abdominal pain, blood in stool, constipation and diarrhea. Endocrine: Negative. Genitourinary:  Negative for frequency and urgency. Musculoskeletal: Negative. Skin: Negative. Allergic/Immunologic: Negative. Neurological:  Negative for dizziness and headaches. Hematological: Negative. Psychiatric/Behavioral:  Negative for sleep disturbance. The patient is not nervous/anxious.       PAST MEDICAL HISTORY    Past Medical History:   Diagnosis Date    Diabetes type 2, controlled ([address]     Head injury 2020    closed head injury    Hyperlipidemia     Subdural hematoma 1980    aparna hole left parietal       FAMILY HISTORY    Family History   Problem Relation Age of Onset    Parkinsonism Father        SOCIAL HISTORY    Social History     Socioeconomic History    Marital status:      Spouse name: None    Number of children: 1    Years of education: None    Highest education level: None   Occupational History    Occupation: retired    Tobacco Use    Smoking status: Never    Smokeless tobacco: Never   Substance and Sexual Activity    Alcohol use: Never    Drug use: Never     Social Determinants of Health     Financial Resource Strain: Low Risk     Difficulty of Paying Living Expenses: Not hard at all   Food Insecurity: No Food Insecurity    Worried About [address] in the Last Year: Never true    [address] in the Last Year: Never true   Transportation Needs: Unknown    Lack of Transportation (Non-Medical): No   Housing Stability: Unknown    Unstable Housing in the Last Year: No       SURGICAL HISTORY    Past Surgical History:   Procedure Laterality Date    Norbert Erp  2001    COLONOSCOPY  2014    2004. Dr. Madeline Kim at [facility]    Current Outpatient Medications   Medication Sig Dispense Refill    blood glucose test strips (FREESTYLE LITE) strip USE ONE STRIP TO TEST THREE TIMES A DAY AND AS NEEDED FOR SYMPTOMS OF IRREGULAR BLOOD GLUCOSE 100 strip 3    metFORMIN (GLUCOPHAGE) 500 MG tablet TAKE ONE TABLET BY MOUTH THREE TIMES A DAY WITH MEALS 270 tablet 3    atorvastatin (LIPITOR) 10 MG tablet TAKE ONE TABLET BY MOUTH DAILY 90 tablet 3    Easy Touch Lancets 33G/Twist MISC Test bs 2-3 times daily 100 each 3    lisinopril (PRINIVIL;ZESTRIL) 2.5 MG tablet Take 1 tablet by mouth daily 90 tablet 3    calcium carbonate 1500 (600 Ca) MG TABS tablet Take 600 mg by mouth daily (with breakfast)      glucose monitoring kit (FREESTYLE) monitoring kit 1 kit by Does not apply route daily 1 kit 0     No current facility-administered medications for this visit. ALLERGIES    Allergies   Allergen Reactions    No Known Allergies        PHYSICAL EXAM   Physical Exam  Vitals reviewed. Constitutional:       Appearance: He is well-developed. HENT:      Head: Normocephalic. Eyes:      Pupils: Pupils are equal, round, and reactive to light. Neck:      Thyroid: No thyromegaly. Cardiovascular:      Rate and Rhythm: Normal rate and regular rhythm.   Heart sounds: Normal heart sounds. No murmur heard.  Pulmonary:      Effort: Pulmonary effort is normal.      Breath sounds: Normal breath sounds. No wheezing or rales.   Abdominal:      Palpations: Abdomen is soft.      Tenderness: There is no abdominal tenderness. There is no guarding or rebound.   Musculoskeletal:         General: No tenderness or deformity. Normal range of motion.      Cervical back: Normal range of motion and neck supple.   Lymphadenopathy:      Cervical: No cervical adenopathy.   Skin:     General: Skin is warm and dry.   Neurological:      Mental Status: He is alert and oriented to person, place, and time.   Psychiatric:         Mood and Affect: Mood normal.         Behavior: Behavior normal.         Thought Content: Thought content normal.         Judgment: Judgment normal.       ASSESSMENT/PLAN  1. Controlled type 2 diabetes mellitus without complication, without long-term current use of insulin ([facility])  Results for orders placed or performed in visit on 02/14/23   POCT glycosylated hemoglobin (Hb A1C)   Result Value Ref Range    Hemoglobin A1C 5.8 %     Discussed reducing metformin dose.  He would like to wait till next appointment and see how his A1c is at that time.  Continue healthy lifestyle  - POCT glycosylated hemoglobin (Hb A1C)  - blood glucose test strips (FREESTYLE LITE) strip; USE ONE STRIP TO TEST THREE TIMES A DAY AND AS NEEDED FOR SYMPTOMS OF IRREGULAR BLOOD GLUCOSE  Dispense: 100 strip; Refill: 3  - Comprehensive Metabolic Panel; Future  - Lipid Panel; Future    2. Bilateral hearing loss, unspecified hearing loss type    - AFL - [place], AUD, Audiology, Andrea    3. Mixed hyperlipidemia  Chronic and controlled on statin and healthy diet  - Comprehensive Metabolic Panel; Future  - Lipid Panel; Future    4. Screening for prostate cancer  Patient would like to screen 1 more time.  Monitor for any symptoms of enlarged prostate.  - PSA Screening; Future    5. Need for  pneumococcal vaccination    - Pneumococcal, PCV20, PREVNAR 21, (age 25 yrs+), IM, PF       Anupam received counseling on the following healthy behaviors: nutrition, exercise, and medication adherence  Reviewed prior labs and health maintenance. Continue current medications, diet and exercise. Discussed use, benefit, and side effects of prescribed medications. Barriers to medication compliance addressed. Patient given educational materials - see patient instructions. All patient questions answered. Patient voiced understanding. Return in about 6 months (around 8/14/2023).         Electronically signed by Susan Ortiz MD on 2/14/23 at 11:33 AM EST

## 2023-06-18 DIAGNOSIS — E11.9 CONTROLLED TYPE 2 DIABETES MELLITUS WITHOUT COMPLICATION, WITHOUT LONG-TERM CURRENT USE OF INSULIN (HCC): ICD-10-CM

## 2023-06-19 RX ORDER — LISINOPRIL 2.5 MG/1
TABLET ORAL
Qty: 90 TABLET | Refills: 3 | Status: SHIPPED | OUTPATIENT
Start: 2023-06-19

## 2023-08-07 ENCOUNTER — HOSPITAL ENCOUNTER (OUTPATIENT)
Age: 78
Setting detail: SPECIMEN
Discharge: HOME | End: 2023-08-07

## 2023-08-07 DIAGNOSIS — E78.2 MIXED HYPERLIPIDEMIA: ICD-10-CM

## 2023-08-07 DIAGNOSIS — Z12.5 SCREENING FOR PROSTATE CANCER: ICD-10-CM

## 2023-08-07 DIAGNOSIS — E11.9 CONTROLLED TYPE 2 DIABETES MELLITUS WITHOUT COMPLICATION, WITHOUT LONG-TERM CURRENT USE OF INSULIN (HCC): ICD-10-CM

## 2023-08-07 LAB
ALBUMIN SERPL-MCNC: 4.4 G/DL (ref 3.5–5.2)
ALBUMIN/GLOB SERPL: 1.8 {RATIO} (ref 1–2.5)
ALP SERPL-CCNC: 69 U/L (ref 40–129)
ALT SERPL-CCNC: 13 U/L (ref 5–41)
ANION GAP SERPL CALCULATED.3IONS-SCNC: 14 MMOL/L (ref 9–17)
AST SERPL-CCNC: 18 U/L
BILIRUB SERPL-MCNC: 0.5 MG/DL (ref 0.3–1.2)
BUN SERPL-MCNC: 19 MG/DL (ref 8–23)
CALCIUM SERPL-MCNC: 9.6 MG/DL (ref 8.6–10.4)
CHLORIDE SERPL-SCNC: 103 MMOL/L (ref 98–107)
CHOLEST SERPL-MCNC: 129 MG/DL
CHOLESTEROL/HDL RATIO: 2.5
CO2 SERPL-SCNC: 26 MMOL/L (ref 20–31)
CREAT SERPL-MCNC: 1.2 MG/DL (ref 0.7–1.2)
GFR SERPL CREATININE-BSD FRML MDRD: >60 ML/MIN/1.73M2
GLUCOSE SERPL-MCNC: 99 MG/DL (ref 70–99)
HDLC SERPL-MCNC: 52 MG/DL
LDLC SERPL CALC-MCNC: 61 MG/DL (ref 0–130)
POTASSIUM SERPL-SCNC: 4.2 MMOL/L (ref 3.7–5.3)
PROT SERPL-MCNC: 6.8 G/DL (ref 6.4–8.3)
PSA SERPL-MCNC: 1.88 NG/ML
SODIUM SERPL-SCNC: 143 MMOL/L (ref 135–144)
TRIGL SERPL-MCNC: 82 MG/DL

## 2023-08-14 ENCOUNTER — OFFICE VISIT (OUTPATIENT)
Dept: FAMILY MEDICINE CLINIC | Age: 78
End: 2023-08-14
Payer: MEDICARE

## 2023-08-14 VITALS
HEART RATE: 68 BPM | SYSTOLIC BLOOD PRESSURE: 120 MMHG | WEIGHT: 134.6 LBS | DIASTOLIC BLOOD PRESSURE: 72 MMHG | BODY MASS INDEX: 22.75 KG/M2

## 2023-08-14 DIAGNOSIS — E78.2 MIXED HYPERLIPIDEMIA: ICD-10-CM

## 2023-08-14 DIAGNOSIS — E11.9 CONTROLLED TYPE 2 DIABETES MELLITUS WITHOUT COMPLICATION, WITHOUT LONG-TERM CURRENT USE OF INSULIN (HCC): Primary | ICD-10-CM

## 2023-08-14 LAB — HBA1C MFR BLD: 5.8 %

## 2023-08-14 PROCEDURE — 3044F HG A1C LEVEL LT 7.0%: CPT | Performed by: FAMILY MEDICINE

## 2023-08-14 PROCEDURE — 83037 HB GLYCOSYLATED A1C HOME DEV: CPT | Performed by: FAMILY MEDICINE

## 2023-08-14 PROCEDURE — 1123F ACP DISCUSS/DSCN MKR DOCD: CPT | Performed by: FAMILY MEDICINE

## 2023-08-14 PROCEDURE — G8420 CALC BMI NORM PARAMETERS: HCPCS | Performed by: FAMILY MEDICINE

## 2023-08-14 PROCEDURE — 99214 OFFICE O/P EST MOD 30 MIN: CPT | Performed by: FAMILY MEDICINE

## 2023-08-14 PROCEDURE — G8427 DOCREV CUR MEDS BY ELIG CLIN: HCPCS | Performed by: FAMILY MEDICINE

## 2023-08-14 PROCEDURE — 1036F TOBACCO NON-USER: CPT | Performed by: FAMILY MEDICINE

## 2023-08-14 ASSESSMENT — ENCOUNTER SYMPTOMS
CONSTIPATION: 0
BLOOD IN STOOL: 0
ABDOMINAL PAIN: 0
DIARRHEA: 0
ALLERGIC/IMMUNOLOGIC NEGATIVE: 1
EYES NEGATIVE: 1
SHORTNESS OF BREATH: 0
COUGH: 0

## 2023-08-14 NOTE — PROGRESS NOTES
[address] [address]  Dept: [phone]    8/14/2023    CHIEF COMPLAINT    Chief Complaint   Patient presents with    Diabetes       HPI    Alexandra Richards is a 76 y.o. male who presents   Chief Complaint   Patient presents with    Diabetes   . Recheck diabetes, htn, high cholesterol. Had recent labs that were normal. Stays active walking daily. Vitals:    08/14/23 0938   BP: 120/72   Pulse: 68   Weight: 134 lb 9.6 oz (61.1 kg)       REVIEW OF SYSTEMS    Review of Systems   Constitutional:  Negative for fatigue, fever and unexpected weight change. HENT: Negative. Eyes: Negative. Respiratory:  Negative for cough and shortness of breath. Cardiovascular:  Negative for chest pain and leg swelling. Gastrointestinal:  Negative for abdominal pain, blood in stool, constipation and diarrhea. Endocrine: Negative. Genitourinary:  Negative for frequency and urgency. Musculoskeletal: Negative. Skin: Negative. Allergic/Immunologic: Negative. Neurological:  Negative for dizziness and headaches. Hematological: Negative. Psychiatric/Behavioral:  Negative for sleep disturbance. The patient is not nervous/anxious.       PAST MEDICAL HISTORY    Past Medical History:   Diagnosis Date    Diabetes type 2, controlled ([address])     Head injury 2020    closed head injury    Hyperlipidemia     Subdural hematoma ([address]) 1980    aparna hole left parietal       FAMILY HISTORY    Family History   Problem Relation Age of Onset    Parkinsonism Father        SOCIAL HISTORY    Social History     Socioeconomic History    Marital status:      Spouse name: None    Number of children: 1    Years of education: None    Highest education level: None   Occupational History    Occupation: retired    Tobacco Use    Smoking status: Never    Smokeless tobacco: Never   Substance and Sexual Activity    Alcohol

## 2023-12-15 DIAGNOSIS — E11.9 CONTROLLED TYPE 2 DIABETES MELLITUS WITHOUT COMPLICATION, WITHOUT LONG-TERM CURRENT USE OF INSULIN (HCC): ICD-10-CM

## 2023-12-15 DIAGNOSIS — E78.2 MIXED HYPERLIPIDEMIA: ICD-10-CM

## 2023-12-15 RX ORDER — ATORVASTATIN CALCIUM 10 MG/1
TABLET, FILM COATED ORAL
Qty: 90 TABLET | Refills: 3 | Status: SHIPPED | OUTPATIENT
Start: 2023-12-15

## 2024-02-06 DIAGNOSIS — Z78.9 VEGETARIAN DIET: Primary | ICD-10-CM

## 2024-02-06 DIAGNOSIS — R63.8 OTHER SYMPTOMS AND SIGNS CONCERNING FOOD AND FLUID INTAKE: ICD-10-CM

## 2024-02-09 ENCOUNTER — HOSPITAL ENCOUNTER (OUTPATIENT)
Age: 79
Setting detail: SPECIMEN
Discharge: HOME | End: 2024-02-09

## 2024-02-09 DIAGNOSIS — R63.8 OTHER SYMPTOMS AND SIGNS CONCERNING FOOD AND FLUID INTAKE: ICD-10-CM

## 2024-02-09 DIAGNOSIS — Z13.29 SCREENING FOR THYROID DISORDER: ICD-10-CM

## 2024-02-09 DIAGNOSIS — Z78.9 VEGETARIAN DIET: ICD-10-CM

## 2024-02-09 DIAGNOSIS — R26.89 BALANCE DISORDER: ICD-10-CM

## 2024-02-09 DIAGNOSIS — E08.9 DIABETES MELLITUS DUE TO UNDERLYING CONDITION WITHOUT COMPLICATION, WITHOUT LONG-TERM CURRENT USE OF INSULIN (HCC): ICD-10-CM

## 2024-02-09 DIAGNOSIS — E11.9 CONTROLLED TYPE 2 DIABETES MELLITUS WITHOUT COMPLICATION, WITHOUT LONG-TERM CURRENT USE OF INSULIN (HCC): ICD-10-CM

## 2024-02-09 DIAGNOSIS — E78.2 MIXED HYPERLIPIDEMIA: ICD-10-CM

## 2024-02-09 DIAGNOSIS — Z13.21 ENCOUNTER FOR VITAMIN DEFICIENCY SCREENING: ICD-10-CM

## 2024-02-09 DIAGNOSIS — E55.9 VITAMIN D DEFICIENCY: ICD-10-CM

## 2024-02-09 LAB
25(OH)D3 SERPL-MCNC: 35 NG/ML
ALBUMIN SERPL-MCNC: 3.9 G/DL (ref 3.5–5.2)
ALBUMIN/GLOB SERPL: 1.4 {RATIO} (ref 1–2.5)
ALP SERPL-CCNC: 64 U/L (ref 40–129)
ALT SERPL-CCNC: 11 U/L (ref 5–41)
ANION GAP SERPL CALCULATED.3IONS-SCNC: 9 MMOL/L (ref 9–17)
AST SERPL-CCNC: 16 U/L
BASOPHILS # BLD: 0.03 K/UL (ref 0–0.2)
BASOPHILS NFR BLD: 1 % (ref 0–2)
BILIRUB SERPL-MCNC: 0.4 MG/DL (ref 0.3–1.2)
BUN SERPL-MCNC: 22 MG/DL (ref 8–23)
CALCIUM SERPL-MCNC: 9.1 MG/DL (ref 8.6–10.4)
CHLORIDE SERPL-SCNC: 101 MMOL/L (ref 98–107)
CHOLEST SERPL-MCNC: 115 MG/DL
CHOLESTEROL/HDL RATIO: 2.7
CO2 SERPL-SCNC: 27 MMOL/L (ref 20–31)
CREAT SERPL-MCNC: 1.2 MG/DL (ref 0.7–1.2)
CREAT UR-MCNC: 35 MG/DL (ref 39–259)
CREAT UR-MCNC: 35.7 MG/DL (ref 39–259)
EOSINOPHIL # BLD: 0.09 K/UL (ref 0–0.44)
EOSINOPHILS RELATIVE PERCENT: 2 % (ref 1–4)
ERYTHROCYTE [DISTWIDTH] IN BLOOD BY AUTOMATED COUNT: 12.9 % (ref 11.8–14.4)
EST. AVERAGE GLUCOSE BLD GHB EST-MCNC: 123 MG/DL
FERRITIN SERPL-MCNC: 65 NG/ML (ref 30–400)
FOLATE SERPL-MCNC: >20 NG/ML
GFR SERPL CREATININE-BSD FRML MDRD: >60 ML/MIN/1.73M2
GLUCOSE SERPL-MCNC: 97 MG/DL (ref 70–99)
HBA1C MFR BLD: 5.9 % (ref 4–6)
HCT VFR BLD AUTO: 41.1 % (ref 40.7–50.3)
HDLC SERPL-MCNC: 43 MG/DL
HGB BLD-MCNC: 12.7 G/DL (ref 13–17)
IMM GRANULOCYTES # BLD AUTO: <0.03 K/UL (ref 0–0.3)
IMM GRANULOCYTES NFR BLD: 0 %
LDLC SERPL CALC-MCNC: 52 MG/DL (ref 0–130)
LYMPHOCYTES NFR BLD: 1.18 K/UL (ref 1.1–3.7)
LYMPHOCYTES RELATIVE PERCENT: 26 % (ref 24–43)
MCH RBC QN AUTO: 30.2 PG (ref 25.2–33.5)
MCHC RBC AUTO-ENTMCNC: 30.9 G/DL (ref 28.4–34.8)
MCV RBC AUTO: 97.9 FL (ref 82.6–102.9)
MICROALBUMIN UR-MCNC: 21 MG/L (ref 0–20)
MICROALBUMIN/CREAT UR-RTO: 59 MCG/MG CREAT (ref 0–17)
MONOCYTES NFR BLD: 0.38 K/UL (ref 0.1–1.2)
MONOCYTES NFR BLD: 8 % (ref 3–12)
NEUTROPHILS NFR BLD: 63 % (ref 36–65)
NEUTS SEG NFR BLD: 2.85 K/UL (ref 1.5–8.1)
NRBC BLD-RTO: 0 PER 100 WBC
PLATELET # BLD AUTO: 171 K/UL (ref 138–453)
PMV BLD AUTO: 12.1 FL (ref 8.1–13.5)
POTASSIUM SERPL-SCNC: 4.6 MMOL/L (ref 3.7–5.3)
PROT SERPL-MCNC: 6.7 G/DL (ref 6.4–8.3)
RBC # BLD AUTO: 4.2 M/UL (ref 4.21–5.77)
SODIUM SERPL-SCNC: 137 MMOL/L (ref 135–144)
T4 FREE SERPL-MCNC: 1.2 NG/DL (ref 0.9–1.7)
TOTAL PROTEIN, URINE: 8 MG/DL
TRIGL SERPL-MCNC: 102 MG/DL
TSH SERPL DL<=0.05 MIU/L-ACNC: 3.14 UIU/ML (ref 0.3–5)
URINE TOTAL PROTEIN CREATININE RATIO: 0.21
VIT B12 SERPL-MCNC: 1253 PG/ML (ref 232–1245)
WBC OTHER # BLD: 4.5 K/UL (ref 3.5–11.3)

## 2024-02-15 ENCOUNTER — OFFICE VISIT (OUTPATIENT)
Dept: FAMILY MEDICINE CLINIC | Age: 79
End: 2024-02-15

## 2024-02-15 VITALS
HEART RATE: 80 BPM | BODY MASS INDEX: 22.95 KG/M2 | SYSTOLIC BLOOD PRESSURE: 144 MMHG | DIASTOLIC BLOOD PRESSURE: 70 MMHG | WEIGHT: 135.8 LBS

## 2024-02-15 DIAGNOSIS — E78.2 MIXED HYPERLIPIDEMIA: ICD-10-CM

## 2024-02-15 DIAGNOSIS — E11.9 CONTROLLED TYPE 2 DIABETES MELLITUS WITHOUT COMPLICATION, WITHOUT LONG-TERM CURRENT USE OF INSULIN (HCC): ICD-10-CM

## 2024-02-15 DIAGNOSIS — S06.5XAA SUBDURAL HEMATOMA (HCC): ICD-10-CM

## 2024-02-15 DIAGNOSIS — R26.89 BALANCE DISORDER: ICD-10-CM

## 2024-02-15 DIAGNOSIS — Z00.00 INITIAL MEDICARE ANNUAL WELLNESS VISIT: Primary | ICD-10-CM

## 2024-02-15 DIAGNOSIS — H91.93 BILATERAL HEARING LOSS, UNSPECIFIED HEARING LOSS TYPE: ICD-10-CM

## 2024-02-15 NOTE — PROGRESS NOTES
Medicare Annual Wellness Visit    Anupam Carney is here for Medicare AWV  Annual Medicare wellness visit and recheck chronic conditions.  Had recent labs done showing some minor changes in urine creatinine and microalbumin.  A1c was 5.9.  He would like to reduce metformin from 3 daily to 2 daily.  He is walking daily in his neighborhood.  He has some issues with balance when he first starts walking which resolves after few minutes.  He is testing his blood sugar and blood pressure at home which have been in the normal range.        Assessment & Plan   Initial Medicare annual wellness visit  Controlled type 2 diabetes mellitus without complication, without long-term current use of insulin (HCC)-reviewed labs.  A1c 5.9.  Reduce metformin  -     metFORMIN (GLUCOPHAGE) 500 MG tablet; Take 1 tablet by mouth 2 times daily (with meals), Disp-180 tablet, R-3Adjust Sig  Mixed hyperlipidemia-continue statin.  Labs reviewed and up-to-date.  Balance disorder-report worsening of symptoms.  Continue with awareness of walking surfaces and conditions.  Subdural hematoma (HCC)-no residual symptoms  Bilateral hearing loss, unspecified hearing loss type-he is going to look into getting hearing aids.    Recommendations for Preventive Services Due: see orders and patient instructions/AVS.  Recommended screening schedule for the next 5-10 years is provided to the patient in written form: see Patient Instructions/AVS.     Return in about 6 months (around 8/15/2024) for labs, diabetes, htn.     Subjective     Review of Systems   Constitutional:  Negative for fatigue, fever and unexpected weight change.   HENT: Negative.     Eyes: Negative.    Respiratory:  Negative for cough and shortness of breath.    Cardiovascular:  Negative for chest pain and leg swelling.   Gastrointestinal:  Negative for abdominal pain, blood in stool, constipation and diarrhea.   Endocrine: Negative.    Genitourinary:  Negative for frequency and urgency.

## 2024-02-15 NOTE — PATIENT INSTRUCTIONS
your use of this information.      Personalized Preventive Plan for Anupam Carney - 2/15/2024  Medicare offers a range of preventive health benefits. Some of the tests and screenings are paid in full while other may be subject to a deductible, co-insurance, and/or copay.    Some of these benefits include a comprehensive review of your medical history including lifestyle, illnesses that may run in your family, and various assessments and screenings as appropriate.    After reviewing your medical record and screening and assessments performed today your provider may have ordered immunizations, labs, imaging, and/or referrals for you.  A list of these orders (if applicable) as well as your Preventive Care list are included within your After Visit Summary for your review.    Other Preventive Recommendations:    A preventive eye exam performed by an eye specialist is recommended every 1-2 years to screen for glaucoma; cataracts, macular degeneration, and other eye disorders.  A preventive dental visit is recommended every 6 months.  Try to get at least 150 minutes of exercise per week or 10,000 steps per day on a pedometer .  Order or download the FREE \"Exercise & Physical Activity: Your Everyday Guide\" from [employer]. Call 1-726.803.3035 or search [employer] online.  You need 7101-4932 mg of calcium and 7145-6138 IU of vitamin D per day. It is possible to meet your calcium requirement with diet alone, but a vitamin D supplement is usually necessary to meet this goal.  When exposed to the sun, use a sunscreen that protects against both UVA and UVB radiation with an SPF of 30 or greater. Reapply every 2 to 3 hours or after sweating, drying off with a towel, or swimming.  Always wear a seat belt when traveling in a car. Always wear a helmet when riding a bicycle or motorcycle.

## 2024-06-12 DIAGNOSIS — E11.9 CONTROLLED TYPE 2 DIABETES MELLITUS WITHOUT COMPLICATION, WITHOUT LONG-TERM CURRENT USE OF INSULIN (HCC): ICD-10-CM

## 2024-06-12 RX ORDER — LISINOPRIL 2.5 MG/1
2.5 TABLET ORAL DAILY
Qty: 90 TABLET | Refills: 3 | Status: SHIPPED | OUTPATIENT
Start: 2024-06-12

## 2024-08-16 ENCOUNTER — HOSPITAL ENCOUNTER (OUTPATIENT)
Age: 79
Setting detail: SPECIMEN
Discharge: HOME | End: 2024-08-16

## 2024-08-16 DIAGNOSIS — E11.9 CONTROLLED TYPE 2 DIABETES MELLITUS WITHOUT COMPLICATION, WITHOUT LONG-TERM CURRENT USE OF INSULIN (HCC): ICD-10-CM

## 2024-08-16 LAB
25(OH)D3 SERPL-MCNC: 28.9 NG/ML (ref 30–100)
ALBUMIN SERPL-MCNC: 4.5 G/DL (ref 3.5–5.2)
ALBUMIN/GLOB SERPL: 2 {RATIO} (ref 1–2.5)
ALP SERPL-CCNC: 61 U/L (ref 40–129)
ALT SERPL-CCNC: 12 U/L (ref 10–50)
ANION GAP SERPL CALCULATED.3IONS-SCNC: 7 MMOL/L (ref 9–16)
AST SERPL-CCNC: 18 U/L (ref 10–50)
BASOPHILS # BLD: 0.03 K/UL (ref 0–0.2)
BASOPHILS NFR BLD: 1 % (ref 0–2)
BILIRUB SERPL-MCNC: 0.4 MG/DL (ref 0–1.2)
BUN SERPL-MCNC: 23 MG/DL (ref 8–23)
CALCIUM SERPL-MCNC: 9.5 MG/DL (ref 8.6–10.4)
CHLORIDE SERPL-SCNC: 105 MMOL/L (ref 98–107)
CHOLEST SERPL-MCNC: 121 MG/DL (ref 0–199)
CHOLESTEROL/HDL RATIO: 3
CO2 SERPL-SCNC: 29 MMOL/L (ref 20–31)
CREAT SERPL-MCNC: 1.3 MG/DL (ref 0.7–1.2)
CREAT UR-MCNC: 85.3 MG/DL (ref 39–259)
EOSINOPHIL # BLD: 0.09 K/UL (ref 0–0.44)
EOSINOPHILS RELATIVE PERCENT: 2 % (ref 1–4)
ERYTHROCYTE [DISTWIDTH] IN BLOOD BY AUTOMATED COUNT: 12.9 % (ref 11.8–14.4)
EST. AVERAGE GLUCOSE BLD GHB EST-MCNC: 134 MG/DL
FOLATE SERPL-MCNC: >20 NG/ML (ref 4.8–24.2)
GFR, ESTIMATED: 58 ML/MIN/1.73M2
GLUCOSE P FAST SERPL-MCNC: 98 MG/DL (ref 74–99)
HBA1C MFR BLD: 6.3 % (ref 4–6)
HCT VFR BLD AUTO: 40.5 % (ref 40.7–50.3)
HDLC SERPL-MCNC: 47 MG/DL
HGB BLD-MCNC: 13.2 G/DL (ref 13–17)
IMM GRANULOCYTES # BLD AUTO: <0.03 K/UL (ref 0–0.3)
IMM GRANULOCYTES NFR BLD: 0 %
LDLC SERPL CALC-MCNC: 61 MG/DL (ref 0–100)
LYMPHOCYTES NFR BLD: 1.36 K/UL (ref 1.1–3.7)
LYMPHOCYTES RELATIVE PERCENT: 26 % (ref 24–43)
MCH RBC QN AUTO: 30.8 PG (ref 25.2–33.5)
MCHC RBC AUTO-ENTMCNC: 32.6 G/DL (ref 28.4–34.8)
MCV RBC AUTO: 94.6 FL (ref 82.6–102.9)
MONOCYTES NFR BLD: 0.42 K/UL (ref 0.1–1.2)
MONOCYTES NFR BLD: 8 % (ref 3–12)
NEUTROPHILS NFR BLD: 63 % (ref 36–65)
NEUTS SEG NFR BLD: 3.27 K/UL (ref 1.5–8.1)
NRBC BLD-RTO: 0 PER 100 WBC
PLATELET # BLD AUTO: 175 K/UL (ref 138–453)
PMV BLD AUTO: 12.4 FL (ref 8.1–13.5)
POTASSIUM SERPL-SCNC: 4.6 MMOL/L (ref 3.7–5.3)
PROT SERPL-MCNC: 6.6 G/DL (ref 6.6–8.7)
RBC # BLD AUTO: 4.28 M/UL (ref 4.21–5.77)
SODIUM SERPL-SCNC: 141 MMOL/L (ref 136–145)
TOTAL PROTEIN, URINE: 11 MG/DL
TRIGL SERPL-MCNC: 67 MG/DL (ref 0–149)
URINE TOTAL PROTEIN CREATININE RATIO: 0.13
VIT B12 SERPL-MCNC: 364 PG/ML (ref 232–1245)
VLDLC SERPL CALC-MCNC: 13 MG/DL
WBC OTHER # BLD: 5.2 K/UL (ref 3.5–11.3)

## 2024-08-16 SDOH — ECONOMIC STABILITY: FOOD INSECURITY: WITHIN THE PAST 12 MONTHS, THE FOOD YOU BOUGHT JUST DIDN'T LAST AND YOU DIDN'T HAVE MONEY TO GET MORE.: NEVER TRUE

## 2024-08-16 SDOH — ECONOMIC STABILITY: INCOME INSECURITY: HOW HARD IS IT FOR YOU TO PAY FOR THE VERY BASICS LIKE FOOD, HOUSING, MEDICAL CARE, AND HEATING?: NOT HARD AT ALL

## 2024-08-16 SDOH — ECONOMIC STABILITY: FOOD INSECURITY: WITHIN THE PAST 12 MONTHS, YOU WORRIED THAT YOUR FOOD WOULD RUN OUT BEFORE YOU GOT MONEY TO BUY MORE.: NEVER TRUE

## 2024-08-19 ENCOUNTER — OFFICE VISIT (OUTPATIENT)
Dept: FAMILY MEDICINE CLINIC | Age: 79
End: 2024-08-19

## 2024-08-19 VITALS
RESPIRATION RATE: 15 BRPM | OXYGEN SATURATION: 96 % | DIASTOLIC BLOOD PRESSURE: 74 MMHG | WEIGHT: 135.6 LBS | HEIGHT: 65 IN | BODY MASS INDEX: 22.59 KG/M2 | HEART RATE: 77 BPM | SYSTOLIC BLOOD PRESSURE: 138 MMHG | TEMPERATURE: 97.6 F

## 2024-08-19 DIAGNOSIS — N18.31 TYPE 2 DIABETES MELLITUS WITH STAGE 3A CHRONIC KIDNEY DISEASE, WITHOUT LONG-TERM CURRENT USE OF INSULIN (HCC): Primary | ICD-10-CM

## 2024-08-19 DIAGNOSIS — R26.89 BALANCE DISORDER: ICD-10-CM

## 2024-08-19 DIAGNOSIS — E11.22 TYPE 2 DIABETES MELLITUS WITH STAGE 3A CHRONIC KIDNEY DISEASE, WITHOUT LONG-TERM CURRENT USE OF INSULIN (HCC): Primary | ICD-10-CM

## 2024-08-19 DIAGNOSIS — I35.8 AORTIC HEART MURMUR: ICD-10-CM

## 2024-08-19 DIAGNOSIS — E78.2 MIXED HYPERLIPIDEMIA: ICD-10-CM

## 2024-08-19 RX ORDER — LISINOPRIL 2.5 MG/1
2.5 TABLET ORAL DAILY
Qty: 90 TABLET | Refills: 3 | Status: SHIPPED | OUTPATIENT
Start: 2024-08-19

## 2024-08-19 RX ORDER — ATORVASTATIN CALCIUM 10 MG/1
10 TABLET, FILM COATED ORAL DAILY
Qty: 90 TABLET | Refills: 3 | Status: SHIPPED | OUTPATIENT
Start: 2024-08-19

## 2024-08-19 RX ORDER — BLOOD-GLUCOSE METER
KIT MISCELLANEOUS
Qty: 100 STRIP | Refills: 3 | Status: SHIPPED | OUTPATIENT
Start: 2024-08-19

## 2024-08-19 RX ORDER — LANCETS 33 GAUGE
EACH MISCELLANEOUS
Qty: 100 EACH | Refills: 3 | Status: SHIPPED | OUTPATIENT
Start: 2024-08-19

## 2024-08-19 SDOH — ECONOMIC STABILITY: INCOME INSECURITY

## 2024-08-19 SDOH — ECONOMIC STABILITY: FOOD INSECURITY

## 2024-08-19 ASSESSMENT — ENCOUNTER SYMPTOMS
SHORTNESS OF BREATH: 0
ABDOMINAL PAIN: 0
EYES NEGATIVE: 1
COUGH: 0
ALLERGIC/IMMUNOLOGIC NEGATIVE: 1
BLOOD IN STOOL: 0
CONSTIPATION: 0
DIARRHEA: 0

## 2024-08-19 NOTE — PROGRESS NOTES
[facility]  [facility]  [address]  [address]  [place] [address]  Dept: [phone]    8/19/2024    CHIEF COMPLAINT    Chief Complaint   Patient presents with    Diabetes    Hyperlipidemia       HPI    Anupam Carney is a 78 y.o. male who presents   Chief Complaint   Patient presents with    Diabetes    Hyperlipidemia   .  Recheck chronic conditions which include diabetes, hypertension, hyperlipidemia.  He had recent labs done.  Creatinine has increased slightly.  A1c increased from 5.9-6.3.  He has subsequently increased his metformin from twice daily to 3 times daily with meals.  He does test his blood sugar at home.  He walks daily.  He feels off balance for the first 5 minutes and then is stable and able to continue his walk.  He has gone to physical therapy for balance in the past and tries to keep up with the recommendations.        Vitals:    08/19/24 0900   BP: 138/74   Site: Left Upper Arm   Position: Sitting   Cuff Size: Large Adult   Pulse: 77   Resp: 15   Temp: 97.6 °F (36.4 °C)   TempSrc: Temporal   SpO2: 96%   Weight: 61.5 kg (135 lb 9.6 oz)   Height: 1.638 m (5' 4.5\")       REVIEW OF SYSTEMS    Review of Systems   Constitutional:  Negative for fatigue, fever and unexpected weight change.   HENT: Negative.     Eyes: Negative.    Respiratory:  Negative for cough and shortness of breath.    Cardiovascular:  Negative for chest pain and leg swelling.   Gastrointestinal:  Negative for abdominal pain, blood in stool, constipation and diarrhea.   Endocrine: Negative.    Genitourinary:  Negative for frequency and urgency.   Musculoskeletal: Negative.    Skin: Negative.    Allergic/Immunologic: Negative.    Neurological:  Negative for dizziness and headaches.   Hematological: Negative.    Psychiatric/Behavioral:  Negative for sleep disturbance. The patient is not nervous/anxious.        PAST MEDICAL HISTORY    Past Medical History:   Diagnosis Date

## 2024-10-09 ENCOUNTER — TELEPHONE (OUTPATIENT)
Dept: FAMILY MEDICINE CLINIC | Age: 79
End: 2024-10-09

## 2024-10-09 DIAGNOSIS — M79.605 LEFT LEG PAIN: Primary | ICD-10-CM

## 2024-10-09 DIAGNOSIS — R26.89 UNABLE TO BEAR WEIGHT ON LEFT LOWER EXTREMITY: ICD-10-CM

## 2024-10-09 NOTE — TELEPHONE ENCOUNTER
Ordered MRI of left hip and left femur.  Reviewed hospital records and it appears the radiologist suggested a left hip MRI.  I am uncertain if the femur will be covered at this time.      Please fax orders and update family

## 2024-10-09 NOTE — TELEPHONE ENCOUNTER
Pt went to [facility] ER,   Requesting MRI left hip & femur, to rule out fracture (unable to bear weight).   Also, requesting to go to Ortho.   Will call back for name of provider, in order to place referral.    Fax to: [phone]  Phone: [phone]  [facility] ([facility] imaging)

## 2024-10-12 ENCOUNTER — HOSPITAL ENCOUNTER (OUTPATIENT)
Dept: MRI IMAGING | Age: 79
Discharge: HOME | End: 2024-10-14
Payer: MEDICARE

## 2024-10-12 DIAGNOSIS — R26.89 UNABLE TO BEAR WEIGHT ON LEFT LOWER EXTREMITY: ICD-10-CM

## 2024-10-12 DIAGNOSIS — M79.605 LEFT LEG PAIN: ICD-10-CM

## 2024-10-12 PROCEDURE — 73721 MRI JNT OF LWR EXTRE W/O DYE: CPT

## 2024-10-25 DIAGNOSIS — N18.31 TYPE 2 DIABETES MELLITUS WITH STAGE 3A CHRONIC KIDNEY DISEASE, WITHOUT LONG-TERM CURRENT USE OF INSULIN (HCC): ICD-10-CM

## 2024-10-25 DIAGNOSIS — Z79.1 NSAID LONG-TERM USE: Primary | ICD-10-CM

## 2024-10-25 DIAGNOSIS — E11.22 TYPE 2 DIABETES MELLITUS WITH STAGE 3A CHRONIC KIDNEY DISEASE, WITHOUT LONG-TERM CURRENT USE OF INSULIN (HCC): ICD-10-CM

## 2024-10-25 DIAGNOSIS — R29.898 WEAKNESS OF LEFT LOWER EXTREMITY: ICD-10-CM

## 2024-10-30 ENCOUNTER — HOSPITAL ENCOUNTER (OUTPATIENT)
Age: 79
Setting detail: SPECIMEN
Discharge: HOME | End: 2024-10-30

## 2024-10-30 DIAGNOSIS — R29.898 WEAKNESS OF LEFT LOWER EXTREMITY: ICD-10-CM

## 2024-10-30 DIAGNOSIS — N18.31 TYPE 2 DIABETES MELLITUS WITH STAGE 3A CHRONIC KIDNEY DISEASE, WITHOUT LONG-TERM CURRENT USE OF INSULIN (HCC): ICD-10-CM

## 2024-10-30 DIAGNOSIS — Z79.1 NSAID LONG-TERM USE: ICD-10-CM

## 2024-10-30 DIAGNOSIS — E11.22 TYPE 2 DIABETES MELLITUS WITH STAGE 3A CHRONIC KIDNEY DISEASE, WITHOUT LONG-TERM CURRENT USE OF INSULIN (HCC): ICD-10-CM

## 2024-10-30 LAB
ALBUMIN SERPL-MCNC: 4.6 G/DL (ref 3.5–5.2)
ALBUMIN/GLOB SERPL: 2.1 {RATIO} (ref 1–2.5)
ALP SERPL-CCNC: 50 U/L (ref 40–129)
ALT SERPL-CCNC: 18 U/L (ref 10–50)
ANION GAP SERPL CALCULATED.3IONS-SCNC: 8 MMOL/L (ref 9–16)
AST SERPL-CCNC: 22 U/L (ref 10–50)
BILIRUB SERPL-MCNC: 0.5 MG/DL (ref 0–1.2)
BUN SERPL-MCNC: 19 MG/DL (ref 8–23)
CALCIUM SERPL-MCNC: 9.9 MG/DL (ref 8.6–10.4)
CHLORIDE SERPL-SCNC: 103 MMOL/L (ref 98–107)
CO2 SERPL-SCNC: 29 MMOL/L (ref 20–31)
CREAT SERPL-MCNC: 1.3 MG/DL (ref 0.7–1.2)
CREAT UR-MCNC: 31.9 MG/DL (ref 39–259)
GFR, ESTIMATED: 55 ML/MIN/1.73M2
GLUCOSE SERPL-MCNC: 105 MG/DL (ref 74–99)
MAGNESIUM SERPL-MCNC: 2.3 MG/DL (ref 1.6–2.4)
POTASSIUM SERPL-SCNC: 4.7 MMOL/L (ref 3.7–5.3)
PROT SERPL-MCNC: 6.8 G/DL (ref 6.6–8.7)
SODIUM SERPL-SCNC: 140 MMOL/L (ref 136–145)
TOTAL PROTEIN, URINE: <4 MG/DL
URINE TOTAL PROTEIN CREATININE RATIO: ABNORMAL

## 2024-12-12 ENCOUNTER — OFFICE VISIT (OUTPATIENT)
Dept: FAMILY MEDICINE CLINIC | Age: 79
End: 2024-12-12

## 2024-12-12 VITALS
WEIGHT: 135.6 LBS | HEART RATE: 72 BPM | BODY MASS INDEX: 22.92 KG/M2 | SYSTOLIC BLOOD PRESSURE: 122 MMHG | DIASTOLIC BLOOD PRESSURE: 68 MMHG

## 2024-12-12 DIAGNOSIS — I74.10 AORTIC MURAL THROMBUS (HCC): Primary | ICD-10-CM

## 2024-12-12 DIAGNOSIS — E11.22 TYPE 2 DIABETES MELLITUS WITH STAGE 3A CHRONIC KIDNEY DISEASE, WITHOUT LONG-TERM CURRENT USE OF INSULIN (HCC): ICD-10-CM

## 2024-12-12 DIAGNOSIS — N18.31 STAGE 3A CHRONIC KIDNEY DISEASE (HCC): ICD-10-CM

## 2024-12-12 DIAGNOSIS — R01.1 HEART MURMUR: ICD-10-CM

## 2024-12-12 DIAGNOSIS — N18.31 TYPE 2 DIABETES MELLITUS WITH STAGE 3A CHRONIC KIDNEY DISEASE, WITHOUT LONG-TERM CURRENT USE OF INSULIN (HCC): ICD-10-CM

## 2024-12-12 LAB — HBA1C MFR BLD: 6.2 %

## 2024-12-12 NOTE — PROGRESS NOTES
Range    Hemoglobin A1C 6.2 %     A1c is at goal.  May continue metformin at twice daily  - metFORMIN (GLUCOPHAGE) 500 MG tablet; Take 1 tablet by mouth 2 times daily (with meals)  Dispense: 180 tablet; Refill: 3  - External Referral To Nephrology    3. Stage 3a chronic kidney disease (HCC)  Continue hydration current medications  - External Referral To Nephrology-Dr. Luis's group    4. Heart murmur  Get echo and determine whether cardiology referral is indicated  - Echo (TTE) complete (PRN contrast/bubble/strain/3D); Future       Sravandana received counseling on the following healthy behaviors: nutrition, exercise, and medication adherence  Reviewed prior labs and health maintenance.  Continue current medications, diet and exercise.  Discussed use, benefit, and side effects of prescribed medications. Barriers to medication compliance addressed.   Patient given educational materials - see patient instructions.    All patient questions answered.  Patient voiced understanding.      Return in about 2 months (around 2/12/2025).        Electronically signed by Candice Montanez MD on 12/12/24 at 9:38 AM EST

## 2024-12-26 NOTE — TELEPHONE ENCOUNTER
Tellis Alpers is calling to request a refill on the following medication(s):    Last Visit Date (If Applicable):  0/24/2937    Next Visit Date:    1/31/2023    Medication Request:  Requested Prescriptions     Pending Prescriptions Disp Refills    metFORMIN (GLUCOPHAGE) 500 MG tablet [Pharmacy Med Name: metFORMIN  MG TABLET] 270 tablet 3     Sig: TAKE ONE TABLET BY MOUTH THREE TIMES A DAY WITH MEALS    atorvastatin (LIPITOR) 10 MG tablet [Pharmacy Med Name: ATORVASTATIN 10 MG TABLET] 90 tablet 3     Sig: TAKE ONE TABLET BY MOUTH DAILY No

## 2025-02-14 DIAGNOSIS — N18.31 STAGE 3A CHRONIC KIDNEY DISEASE (HCC): ICD-10-CM

## 2025-02-14 DIAGNOSIS — Z13.0 SCREENING FOR ENDOCRINE, METABOLIC AND IMMUNITY DISORDER: ICD-10-CM

## 2025-02-14 DIAGNOSIS — Z13.29 SCREENING FOR THYROID DISORDER: ICD-10-CM

## 2025-02-14 DIAGNOSIS — E11.22 TYPE 2 DIABETES MELLITUS WITH STAGE 3A CHRONIC KIDNEY DISEASE, WITHOUT LONG-TERM CURRENT USE OF INSULIN (HCC): Primary | ICD-10-CM

## 2025-02-14 DIAGNOSIS — Z13.228 SCREENING FOR ENDOCRINE, METABOLIC AND IMMUNITY DISORDER: ICD-10-CM

## 2025-02-14 DIAGNOSIS — N18.31 TYPE 2 DIABETES MELLITUS WITH STAGE 3A CHRONIC KIDNEY DISEASE, WITHOUT LONG-TERM CURRENT USE OF INSULIN (HCC): Primary | ICD-10-CM

## 2025-02-14 DIAGNOSIS — Z13.29 SCREENING FOR ENDOCRINE, METABOLIC AND IMMUNITY DISORDER: ICD-10-CM

## 2025-02-18 ENCOUNTER — HOSPITAL ENCOUNTER (OUTPATIENT)
Age: 80
Setting detail: SPECIMEN
Discharge: HOME | End: 2025-02-18

## 2025-02-18 DIAGNOSIS — E55.9 VITAMIN D DEFICIENCY: ICD-10-CM

## 2025-02-18 DIAGNOSIS — Z12.5 SCREENING FOR PROSTATE CANCER: ICD-10-CM

## 2025-02-18 DIAGNOSIS — E11.22 TYPE 2 DIABETES MELLITUS WITH STAGE 3A CHRONIC KIDNEY DISEASE, WITHOUT LONG-TERM CURRENT USE OF INSULIN (HCC): ICD-10-CM

## 2025-02-18 DIAGNOSIS — Z13.29 SCREENING FOR ENDOCRINE, METABOLIC AND IMMUNITY DISORDER: ICD-10-CM

## 2025-02-18 DIAGNOSIS — E78.2 MIXED HYPERLIPIDEMIA: ICD-10-CM

## 2025-02-18 DIAGNOSIS — N18.31 TYPE 2 DIABETES MELLITUS WITH STAGE 3A CHRONIC KIDNEY DISEASE, WITHOUT LONG-TERM CURRENT USE OF INSULIN (HCC): ICD-10-CM

## 2025-02-18 DIAGNOSIS — Z13.29 SCREENING FOR THYROID DISORDER: ICD-10-CM

## 2025-02-18 DIAGNOSIS — N18.31 STAGE 3A CHRONIC KIDNEY DISEASE (HCC): ICD-10-CM

## 2025-02-18 DIAGNOSIS — Z13.228 SCREENING FOR ENDOCRINE, METABOLIC AND IMMUNITY DISORDER: ICD-10-CM

## 2025-02-18 DIAGNOSIS — R26.89 BALANCE DISORDER: ICD-10-CM

## 2025-02-18 DIAGNOSIS — Z13.0 SCREENING FOR ENDOCRINE, METABOLIC AND IMMUNITY DISORDER: ICD-10-CM

## 2025-02-18 DIAGNOSIS — E11.9 CONTROLLED TYPE 2 DIABETES MELLITUS WITHOUT COMPLICATION, WITHOUT LONG-TERM CURRENT USE OF INSULIN (HCC): ICD-10-CM

## 2025-02-18 LAB
25(OH)D3 SERPL-MCNC: 36.4 NG/ML (ref 30–100)
ALBUMIN SERPL-MCNC: 4.4 G/DL (ref 3.5–5.2)
ALBUMIN/GLOB SERPL: 1.8 {RATIO} (ref 1–2.5)
ALP SERPL-CCNC: 59 U/L (ref 40–129)
ALT SERPL-CCNC: 14 U/L (ref 10–50)
ANION GAP SERPL CALCULATED.3IONS-SCNC: 9 MMOL/L (ref 9–16)
AST SERPL-CCNC: 26 U/L (ref 10–50)
BASOPHILS # BLD: 0.03 K/UL (ref 0–0.2)
BASOPHILS NFR BLD: 1 % (ref 0–2)
BILIRUB SERPL-MCNC: 0.3 MG/DL (ref 0–1.2)
BUN SERPL-MCNC: 27 MG/DL (ref 8–23)
CALCIUM SERPL-MCNC: 9.2 MG/DL (ref 8.6–10.4)
CHLORIDE SERPL-SCNC: 102 MMOL/L (ref 98–107)
CHOLEST SERPL-MCNC: 135 MG/DL (ref 0–199)
CHOLESTEROL/HDL RATIO: 3
CO2 SERPL-SCNC: 27 MMOL/L (ref 20–31)
CREAT SERPL-MCNC: 1.2 MG/DL (ref 0.7–1.2)
CREAT UR-MCNC: 72.2 MG/DL (ref 39–259)
EOSINOPHIL # BLD: 0.08 K/UL (ref 0–0.44)
EOSINOPHILS RELATIVE PERCENT: 1 % (ref 1–4)
ERYTHROCYTE [DISTWIDTH] IN BLOOD BY AUTOMATED COUNT: 12.5 % (ref 11.8–14.4)
EST. AVERAGE GLUCOSE BLD GHB EST-MCNC: 134 MG/DL
FERRITIN SERPL-MCNC: 46 NG/ML
FOLATE SERPL-MCNC: 39 NG/ML (ref 4.8–24.2)
GFR, ESTIMATED: 61 ML/MIN/1.73M2
GLUCOSE SERPL-MCNC: 103 MG/DL (ref 74–99)
HBA1C MFR BLD: 6.3 % (ref 4–6)
HCT VFR BLD AUTO: 41.4 % (ref 40.7–50.3)
HDLC SERPL-MCNC: 45 MG/DL
HGB BLD-MCNC: 12.9 G/DL (ref 13–17)
IMM GRANULOCYTES # BLD AUTO: <0.03 K/UL (ref 0–0.3)
IMM GRANULOCYTES NFR BLD: 0 %
LDLC SERPL CALC-MCNC: 68 MG/DL (ref 0–100)
LYMPHOCYTES NFR BLD: 1.23 K/UL (ref 1.1–3.7)
LYMPHOCYTES RELATIVE PERCENT: 21 % (ref 24–43)
MCH RBC QN AUTO: 30.3 PG (ref 25.2–33.5)
MCHC RBC AUTO-ENTMCNC: 31.2 G/DL (ref 28.4–34.8)
MCV RBC AUTO: 97.2 FL (ref 82.6–102.9)
MONOCYTES NFR BLD: 0.46 K/UL (ref 0.1–1.2)
MONOCYTES NFR BLD: 8 % (ref 3–12)
NEUTROPHILS NFR BLD: 69 % (ref 36–65)
NEUTS SEG NFR BLD: 3.98 K/UL (ref 1.5–8.1)
NRBC BLD-RTO: 0 PER 100 WBC
PLATELET # BLD AUTO: 156 K/UL (ref 138–453)
PMV BLD AUTO: 11.8 FL (ref 8.1–13.5)
POTASSIUM SERPL-SCNC: 4.4 MMOL/L (ref 3.7–5.3)
PROT SERPL-MCNC: 6.9 G/DL (ref 6.6–8.7)
PSA SERPL-MCNC: 1.2 NG/ML (ref 0–4)
RBC # BLD AUTO: 4.26 M/UL (ref 4.21–5.77)
RUBV IGG SERPL QL IA: 76.9 IU/ML
SODIUM SERPL-SCNC: 138 MMOL/L (ref 136–145)
T4 FREE SERPL-MCNC: 1.1 NG/DL (ref 0.9–1.7)
TOTAL PROTEIN, URINE: 9 MG/DL
TRIGL SERPL-MCNC: 108 MG/DL
TSH SERPL DL<=0.05 MIU/L-ACNC: 4.33 UIU/ML (ref 0.27–4.2)
URINE TOTAL PROTEIN CREATININE RATIO: 0.12 (ref 0–0.2)
VIT B12 SERPL-MCNC: 384 PG/ML (ref 232–1245)
VLDLC SERPL CALC-MCNC: 22 MG/DL (ref 1–30)
WBC OTHER # BLD: 5.8 K/UL (ref 3.5–11.3)

## 2025-02-18 SDOH — ECONOMIC STABILITY: FOOD INSECURITY: WITHIN THE PAST 12 MONTHS, THE FOOD YOU BOUGHT JUST DIDN'T LAST AND YOU DIDN'T HAVE MONEY TO GET MORE.: NEVER TRUE

## 2025-02-18 SDOH — ECONOMIC STABILITY: INCOME INSECURITY: IN THE LAST 12 MONTHS, WAS THERE A TIME WHEN YOU WERE NOT ABLE TO PAY THE MORTGAGE OR RENT ON TIME?: NO

## 2025-02-18 SDOH — ECONOMIC STABILITY: FOOD INSECURITY: WITHIN THE PAST 12 MONTHS, YOU WORRIED THAT YOUR FOOD WOULD RUN OUT BEFORE YOU GOT MONEY TO BUY MORE.: NEVER TRUE

## 2025-02-18 SDOH — HEALTH STABILITY: PHYSICAL HEALTH: ON AVERAGE, HOW MANY DAYS PER WEEK DO YOU ENGAGE IN MODERATE TO STRENUOUS EXERCISE (LIKE A BRISK WALK)?: 4 DAYS

## 2025-02-18 SDOH — HEALTH STABILITY: PHYSICAL HEALTH: ON AVERAGE, HOW MANY MINUTES DO YOU ENGAGE IN EXERCISE AT THIS LEVEL?: 40 MIN

## 2025-02-18 ASSESSMENT — PATIENT HEALTH QUESTIONNAIRE - PHQ9
SUM OF ALL RESPONSES TO PHQ QUESTIONS 1-9: 0
2. FEELING DOWN, DEPRESSED OR HOPELESS: NOT AT ALL
SUM OF ALL RESPONSES TO PHQ QUESTIONS 1-9: 0
1. LITTLE INTEREST OR PLEASURE IN DOING THINGS: NOT AT ALL
SUM OF ALL RESPONSES TO PHQ QUESTIONS 1-9: 0
SUM OF ALL RESPONSES TO PHQ9 QUESTIONS 1 & 2: 0
SUM OF ALL RESPONSES TO PHQ QUESTIONS 1-9: 0

## 2025-02-18 ASSESSMENT — LIFESTYLE VARIABLES
HOW OFTEN DO YOU HAVE SIX OR MORE DRINKS ON ONE OCCASION: 1
HOW OFTEN DO YOU HAVE A DRINK CONTAINING ALCOHOL: NEVER
HOW MANY STANDARD DRINKS CONTAINING ALCOHOL DO YOU HAVE ON A TYPICAL DAY: PATIENT DOES NOT DRINK
HOW OFTEN DO YOU HAVE A DRINK CONTAINING ALCOHOL: 1
HOW MANY STANDARD DRINKS CONTAINING ALCOHOL DO YOU HAVE ON A TYPICAL DAY: 0

## 2025-02-19 LAB
MEV IGG SER-ACNC: 20.27
MUV IGG SER IA-ACNC: 5.72
VZV IGG SER QL IA: 4.07

## 2025-02-20 ENCOUNTER — OFFICE VISIT (OUTPATIENT)
Dept: FAMILY MEDICINE CLINIC | Age: 80
End: 2025-02-20

## 2025-02-20 VITALS
SYSTOLIC BLOOD PRESSURE: 132 MMHG | DIASTOLIC BLOOD PRESSURE: 78 MMHG | HEIGHT: 65 IN | WEIGHT: 138.8 LBS | OXYGEN SATURATION: 96 % | HEART RATE: 58 BPM | BODY MASS INDEX: 23.13 KG/M2

## 2025-02-20 DIAGNOSIS — N18.31 STAGE 3A CHRONIC KIDNEY DISEASE (HCC): ICD-10-CM

## 2025-02-20 DIAGNOSIS — E78.2 MIXED HYPERLIPIDEMIA: ICD-10-CM

## 2025-02-20 DIAGNOSIS — E11.22 TYPE 2 DIABETES MELLITUS WITH STAGE 3A CHRONIC KIDNEY DISEASE, WITHOUT LONG-TERM CURRENT USE OF INSULIN (HCC): ICD-10-CM

## 2025-02-20 DIAGNOSIS — E55.9 VITAMIN D DEFICIENCY: ICD-10-CM

## 2025-02-20 DIAGNOSIS — N18.31 TYPE 2 DIABETES MELLITUS WITH STAGE 3A CHRONIC KIDNEY DISEASE, WITHOUT LONG-TERM CURRENT USE OF INSULIN (HCC): ICD-10-CM

## 2025-02-20 DIAGNOSIS — I35.8 AORTIC HEART MURMUR: ICD-10-CM

## 2025-02-20 DIAGNOSIS — H91.93 BILATERAL HEARING LOSS, UNSPECIFIED HEARING LOSS TYPE: ICD-10-CM

## 2025-02-20 DIAGNOSIS — Z00.00 MEDICARE ANNUAL WELLNESS VISIT, SUBSEQUENT: Primary | ICD-10-CM

## 2025-02-20 RX ORDER — ATORVASTATIN CALCIUM 10 MG/1
10 TABLET, FILM COATED ORAL DAILY
Qty: 90 TABLET | Refills: 3 | Status: SHIPPED | OUTPATIENT
Start: 2025-02-20

## 2025-02-20 RX ORDER — LANCETS 33 GAUGE
EACH MISCELLANEOUS
Qty: 100 EACH | Refills: 3 | Status: SHIPPED | OUTPATIENT
Start: 2025-02-20

## 2025-02-20 RX ORDER — LYSINE HCL 500 MG
1 TABLET ORAL 2 TIMES DAILY
Qty: 60 TABLET | Refills: 5
Start: 2025-02-20

## 2025-02-20 RX ORDER — LISINOPRIL 2.5 MG/1
2.5 TABLET ORAL DAILY
Qty: 90 TABLET | Refills: 3 | Status: SHIPPED | OUTPATIENT
Start: 2025-02-20

## 2025-02-20 RX ORDER — BLOOD-GLUCOSE METER
KIT MISCELLANEOUS
Qty: 100 STRIP | Refills: 3 | Status: SHIPPED | OUTPATIENT
Start: 2025-02-20

## 2025-02-20 SDOH — ECONOMIC STABILITY: FOOD INSECURITY: WITHIN THE PAST 12 MONTHS, THE FOOD YOU BOUGHT JUST DIDN'T LAST AND YOU DIDN'T HAVE MONEY TO GET MORE.: NEVER TRUE

## 2025-02-20 SDOH — ECONOMIC STABILITY: FOOD INSECURITY: WITHIN THE PAST 12 MONTHS, YOU WORRIED THAT YOUR FOOD WOULD RUN OUT BEFORE YOU GOT MONEY TO BUY MORE.: NEVER TRUE

## 2025-02-20 NOTE — PROGRESS NOTES
Medicare Annual Wellness Visit    Anupam Carney is here for Medicare AWV, Diabetes, and Hypertension    Assessment & Plan   Medicare annual wellness visit, subsequent  Type 2 diabetes mellitus with stage 3a chronic kidney disease, without long-term current use of insulin (HCC)  -     metFORMIN (GLUCOPHAGE) 500 MG tablet; Take 1 tablet by mouth in the morning, at noon, and at bedtime, Disp-270 tablet, R-3Normal  -     lisinopril (PRINIVIL;ZESTRIL) 2.5 MG tablet; Take 1 tablet by mouth daily, Disp-90 tablet, R-3Normal  -     Easy Touch Lancets 33G/Twist MISC; Disp-100 each, R-3, NormalTest bs 2-3 times daily  -     blood glucose test strips (FREESTYLE LITE) strip; USE ONE STRIP TO TEST THREE TIMES A DAY AND AS NEEDED FOR SYMPTOMS OF IRREGULAR BLOOD GLUCOSE, Disp-100 strip, R-3Normal  Stage 3a chronic kidney disease (HCC)  -     Calcium Carbonate-Vit D-Min (CALCIUM 600+D PLUS MINERALS) 600-400 MG-UNIT TABS; Take 1 tablet by mouth 2 times daily, Disp-60 tablet, R-5Adjust Sig  -     lisinopril (PRINIVIL;ZESTRIL) 2.5 MG tablet; Take 1 tablet by mouth daily, Disp-90 tablet, R-3Normal  Mixed hyperlipidemia  -     atorvastatin (LIPITOR) 10 MG tablet; Take 1 tablet by mouth daily, Disp-90 tablet, R-3Normal  Aortic heart murmur  Vitamin D deficiency  Bilateral hearing loss, unspecified hearing loss type       Return in about 6 months (around 8/20/2025) for diabetes, labs.     Subjective       Patient's complete Health Risk Assessment and screening values have been reviewed and are found in Flowsheets. The following problems were reviewed today and where indicated follow up appointments were made and/or referrals ordered.                    Hearing Screen:  Do you or your family notice any trouble with your hearing that hasn't been managed with hearing aids?: (!) Yes    Interventions:  Patient comments: has hearing aids but not wearing them               Objective   Vitals:    02/20/25 0929   BP: 132/78   Site: Left Upper

## 2025-09-02 ENCOUNTER — PATIENT MESSAGE (OUTPATIENT)
Dept: FAMILY MEDICINE CLINIC | Age: 80
End: 2025-09-02

## 2025-09-02 DIAGNOSIS — N18.31 TYPE 2 DIABETES MELLITUS WITH STAGE 3A CHRONIC KIDNEY DISEASE, WITHOUT LONG-TERM CURRENT USE OF INSULIN (HCC): Primary | ICD-10-CM

## 2025-09-02 DIAGNOSIS — E11.22 TYPE 2 DIABETES MELLITUS WITH STAGE 3A CHRONIC KIDNEY DISEASE, WITHOUT LONG-TERM CURRENT USE OF INSULIN (HCC): Primary | ICD-10-CM
